# Patient Record
Sex: FEMALE | Race: BLACK OR AFRICAN AMERICAN | NOT HISPANIC OR LATINO | Employment: UNEMPLOYED | URBAN - METROPOLITAN AREA
[De-identification: names, ages, dates, MRNs, and addresses within clinical notes are randomized per-mention and may not be internally consistent; named-entity substitution may affect disease eponyms.]

---

## 2019-02-11 ENCOUNTER — OFFICE VISIT (OUTPATIENT)
Dept: URGENT CARE | Facility: URGENT CARE | Age: 29
End: 2019-02-11
Payer: COMMERCIAL

## 2019-02-11 ENCOUNTER — NURSE TRIAGE (OUTPATIENT)
Dept: NURSING | Facility: CLINIC | Age: 29
End: 2019-02-11

## 2019-02-11 VITALS
SYSTOLIC BLOOD PRESSURE: 104 MMHG | OXYGEN SATURATION: 100 % | TEMPERATURE: 99.1 F | DIASTOLIC BLOOD PRESSURE: 67 MMHG | HEART RATE: 83 BPM | WEIGHT: 229 LBS

## 2019-02-11 DIAGNOSIS — J20.9 ACUTE BRONCHITIS WITH SYMPTOMS > 10 DAYS: Primary | ICD-10-CM

## 2019-02-11 PROCEDURE — 99213 OFFICE O/P EST LOW 20 MIN: CPT | Performed by: NURSE PRACTITIONER

## 2019-02-11 RX ORDER — ALBUTEROL SULFATE 90 UG/1
1-2 AEROSOL, METERED RESPIRATORY (INHALATION) EVERY 4 HOURS PRN
Qty: 1 INHALER | Refills: 3 | Status: SHIPPED | OUTPATIENT
Start: 2019-02-11 | End: 2019-11-14

## 2019-02-11 RX ORDER — AZITHROMYCIN 250 MG/1
TABLET, FILM COATED ORAL
Qty: 6 TABLET | Refills: 0 | Status: SHIPPED | OUTPATIENT
Start: 2019-02-11 | End: 2019-02-18

## 2019-02-11 NOTE — LETTER
February 11, 2019      Rosanna Leung  [address] [place] [address]        To Whom It May Concern,     Due to a medical condition, Rosanna was seen in urgent care.      Sincerely,        TEGAN La CNP

## 2019-02-12 NOTE — TELEPHONE ENCOUNTER
"\" I have been having some shortness of breath, cough and a tightness in my throat and chest. I did have a chest cold a few weeks ago, but this feels a little different. My coworker has this same thing.\" Patient is having some SOB, but denies chest pain or tightness at this time. Denies fever or other sx. Triaged and advised to be seen. Call back if needed.     Reason for Disposition    [1] MILD difficulty breathing (e.g., minimal/no SOB at rest, SOB with walking, pulse <100) AND [2] NEW-onset or WORSE than normal    Additional Information    Negative: [1] Breathing stopped AND [2] hasn't returned    Negative: Choking on something    Negative: Severe difficulty breathing (e.g., struggling for each breath, speaks in single words)    Negative: Bluish lips, tongue, or face now    Negative: Difficult to awaken or acting confused  (e.g., disoriented, slurred speech)    Negative: Passed out (i.e., lost consciousness, collapsed and was not responding)    Negative: Wheezing started suddenly after medicine, an allergic food or bee sting    Negative: Stridor    Negative: Slow, shallow and weak breathing    Negative: Sounds like a life-threatening emergency to the triager    Negative: Chest pain    Negative: [1] Wheezing (high pitched whistling sound) AND [2] previous asthma attacks or use of asthma medicines    Negative: [1] Difficulty breathing AND [2] only present when coughing    Negative: [1] Difficulty breathing AND [2] only from stuffy or runny nose    Negative: [1] MODERATE difficulty breathing (e.g., speaks in phrases, SOB even at rest, pulse 100-120) AND [2] NEW-onset or WORSE than normal    Negative: Wheezing can be heard across the room    Negative: Drooling or spitting out saliva (because can't swallow)    Negative: History of prior \"blood clot\" in leg or lungs (i.e., deep vein thrombosis, pulmonary embolism)    Negative: History of inherited increased risk of blood clots (e.g., Factor 5 Leiden, Anti-thrombin 3, " "Protein C or Protein S deficiency, Prothrombin mutation)    Negative: Recent illness requiring prolonged bedrest (i.e., immobilization)    Negative: Hip or leg fracture in past 2 months (e.g., had cast on leg or ankle)    Negative: Major surgery in the past month    Negative: Recent long-distance travel with prolonged time in car, bus, plane, or train (i.e., within past 2 weeks; 6 or  more hours duration)    Negative: Extra heart beats OR irregular heart beating   (i.e., \"palpitations\")    Negative: Fever > 103 F (39.4 C)    Negative: [1] Fever > 101 F (38.3 C) AND [2] age > 60    Negative: [1] Fever > 101 F (38.3 C) AND [2] bedridden (e.g., nursing home patient, CVA, chronic illness, recovering from surgery)    Negative: [1] Fever > 100.5 F (38.1 C) AND [2] diabetes mellitus or weak immune system (e.g., HIV positive, cancer chemo, splenectomy, organ transplant, chronic steroids)    Negative: [1] Periods where breathing stops and then resumes normally AND [2] bedridden (e.g., nursing home patient, CVA)    Negative: Pregnant or postpartum (< 1 month since delivery)    Negative: Patient sounds very sick or weak to the triager    Protocols used: BREATHING DIFFICULTY-ADULT-AH      "

## 2019-02-12 NOTE — PROGRESS NOTES
SUBJECTIVE:   Rosanna Leung is a 28 year old female who presents to clinic today for the following health issues:  Chief Complaint   Patient presents with     Urgent Care     Cough     c/o cough and SOB for 1 day       2-3 weeks ago Rosanna developed URI symptoms.  For the past few days, her chest symptoms have worsened.  Cough.  Sinus congestion.  Ears are normal.  Eating and drinking okay.  Energy level is low.  Sore throat.  Coughing with a deep breath/laughing.         Problem list and histories reviewed & adjusted, as indicated.  Additional history: as documented    There is no problem list on file for this patient.    No past surgical history on file.    Social History     Tobacco Use     Smoking status: Never Smoker     Smokeless tobacco: Never Used   Substance Use Topics     Alcohol use: Not on file     No family history on file.      No current outpatient medications on file.     No Known Allergies  No lab results found.   BP Readings from Last 3 Encounters:   02/11/19 104/67    Wt Readings from Last 3 Encounters:   02/11/19 103.9 kg (229 lb)            Labs reviewed in EPIC    Reviewed and updated as needed this visit by clinical staff  Tobacco  Allergies  Meds  Soc Hx      Reviewed and updated as needed this visit by Provider         ROS:  Constitutional, HEENT, cardiovascular, pulmonary, GI, , musculoskeletal, neuro, skin, endocrine and psych systems are negative, except as otherwise noted.    OBJECTIVE:     /67   Pulse 83   Temp 99.1  F (37.3  C) (Tympanic)   Wt 103.9 kg (229 lb)   LMP 01/27/2019   SpO2 100%   There is no height or weight on file to calculate BMI.  EXAM:  Constitutional: healthy, alert, active and no distress  Neck: Neck supple. No adenopathy.  ENT: Bilateral TM's are normal.  Posterior oropharynx is clear.  Nares clear without congestion.  Cardiovascular: S1, S2  Respiratory: occassional hacky cough. Respirations easy and regular. No respiratory distress. Lungs sounds  reduced in the bases. Rare wheezing.   Skin: warm and dry  Psychiatric: mentation appears normal. and affect normal/bright      ASSESSMENT/PLAN:     (J20.9) Acute bronchitis with symptoms > 10 days  (primary encounter diagnosis)  Comment: acute  Plan: azithromycin (ZITHROMAX) 250 MG tablet,         albuterol (PROAIR HFA) 108 (90 Base) MCG/ACT         inhaler        Take antibiotic as prescribed. Symptomatic management (push fluids, good nutrition, MVI if indicated, OTC decongestants, etc). Return prn any problems, questions, or concerns.          TEGAN [facility]

## 2019-02-17 ENCOUNTER — NURSE TRIAGE (OUTPATIENT)
Dept: NURSING | Facility: CLINIC | Age: 29
End: 2019-02-17

## 2019-02-17 NOTE — TELEPHONE ENCOUNTER
"Rosanna called today with concerns over bronchitis symptoms returning.    She was seen on 2/11/19 at Urgent Care and treated for bronchitis. She reports feeling better initially but states yesterday she started feeling worse with return of shortness of breath.     She has been using the prescribed inhaler which she reports does help for a short while.    She is concerned of possibly developing pneumonia.    Per protocol, advised to be seen within 4 hours.  Patient will go to [facility].      Mattie Thomas RN  [facility]        Reason for Disposition    [1] MILD difficulty breathing (e.g., minimal/no SOB at rest, SOB with walking, pulse <100) AND [2] NEW-onset or WORSE than normal    Additional Information    Negative: [1] Breathing stopped AND [2] hasn't returned    Negative: Choking on something    Negative: Severe difficulty breathing (e.g., struggling for each breath, speaks in single words)    Negative: Bluish lips, tongue, or face now    Negative: Difficult to awaken or acting confused  (e.g., disoriented, slurred speech)    Negative: Passed out (i.e., lost consciousness, collapsed and was not responding)    Negative: Wheezing started suddenly after medicine, an allergic food or bee sting    Negative: Stridor    Negative: Slow, shallow and weak breathing    Negative: Sounds like a life-threatening emergency to the triager    Negative: [1] MODERATE difficulty breathing (e.g., speaks in phrases, SOB even at rest, pulse 100-120) AND [2] NEW-onset or WORSE than normal    Negative: Wheezing can be heard across the room    Negative: Drooling or spitting out saliva (because can't swallow)    Negative: History of prior \"blood clot\" in leg or lungs (i.e., deep vein thrombosis, pulmonary embolism)    Negative: History of inherited increased risk of blood clots (e.g., Factor 5 Leiden, Anti-thrombin 3, Protein C or Protein S deficiency, Prothrombin mutation)    Negative: Recent illness " "requiring prolonged bedrest (i.e., immobilization)    Negative: Hip or leg fracture in past 2 months (e.g., had cast on leg or ankle)    Negative: Major surgery in the past month    Negative: Recent long-distance travel with prolonged time in car, bus, plane, or train (i.e., within past 2 weeks; 6 or  more hours duration)    Negative: Extra heart beats OR irregular heart beating   (i.e., \"palpitations\")    Negative: Fever > 103 F (39.4 C)    Negative: [1] Fever > 101 F (38.3 C) AND [2] age > 60    Negative: [1] Fever > 101 F (38.3 C) AND [2] bedridden (e.g., nursing home patient, CVA, chronic illness, recovering from surgery)    Negative: [1] Fever > 100.5 F (38.1 C) AND [2] diabetes mellitus or weak immune system (e.g., HIV positive, cancer chemo, splenectomy, organ transplant, chronic steroids)    Negative: [1] Periods where breathing stops and then resumes normally AND [2] bedridden (e.g., nursing home patient, CVA)    Negative: Pregnant or postpartum (< 1 month since delivery)    Negative: Patient sounds very sick or weak to the triager    Protocols used: BREATHING DIFFICULTY-ADULT-AH      "

## 2019-02-18 ENCOUNTER — ANCILLARY PROCEDURE (OUTPATIENT)
Dept: GENERAL RADIOLOGY | Facility: CLINIC | Age: 29
End: 2019-02-18
Attending: FAMILY MEDICINE
Payer: COMMERCIAL

## 2019-02-18 ENCOUNTER — OFFICE VISIT (OUTPATIENT)
Dept: URGENT CARE | Facility: URGENT CARE | Age: 29
End: 2019-02-18
Payer: COMMERCIAL

## 2019-02-18 VITALS
DIASTOLIC BLOOD PRESSURE: 74 MMHG | TEMPERATURE: 98.4 F | SYSTOLIC BLOOD PRESSURE: 113 MMHG | OXYGEN SATURATION: 99 % | WEIGHT: 239 LBS | HEART RATE: 100 BPM

## 2019-02-18 DIAGNOSIS — J20.9 ACUTE BRONCHITIS WITH SYMPTOMS > 10 DAYS: Primary | ICD-10-CM

## 2019-02-18 DIAGNOSIS — R05.9 COUGH: ICD-10-CM

## 2019-02-18 PROCEDURE — 99214 OFFICE O/P EST MOD 30 MIN: CPT | Performed by: FAMILY MEDICINE

## 2019-02-18 PROCEDURE — 71046 X-RAY EXAM CHEST 2 VIEWS: CPT

## 2019-02-18 RX ORDER — DOXYCYCLINE HYCLATE 100 MG
100 TABLET ORAL 2 TIMES DAILY
Qty: 20 TABLET | Refills: 0 | Status: SHIPPED | OUTPATIENT
Start: 2019-02-18 | End: 2019-04-08

## 2019-02-18 RX ORDER — PREDNISONE 20 MG/1
40 TABLET ORAL DAILY
Qty: 10 TABLET | Refills: 0 | Status: SHIPPED | OUTPATIENT
Start: 2019-02-18 | End: 2019-04-08

## 2019-02-18 RX ORDER — UBIDECARENONE 75 MG
100 CAPSULE ORAL DAILY
COMMUNITY
End: 2020-06-30

## 2019-02-18 NOTE — PATIENT INSTRUCTIONS
Patient Education     Acute Bronchitis  Your healthcare provider has told you that you have acute bronchitis. Bronchitis is infection or inflammation of the bronchial tubes (airways in the lungs). Normally, air moves easily in and out of the airways. Bronchitis narrows the airways, making it harder for air to flow in and out of the lungs. This causes symptoms such as shortness of breath, coughing up yellow or green mucus, and wheezing. Bronchitis can be acute or chronic. Acute means the condition comes on quickly and goes away in a short time, usually within 3 to 10 days. Chronic means a condition lasts a long time and often comes back.    What causes acute bronchitis?  Acute bronchitis almost always starts as a viral respiratory infection, such as a cold or the flu. Certain factors make it more likely for a cold or flu to turn into bronchitis. These include being very young, being elderly, having a heart or lung problem, or having a weak immune system. Cigarette smoking also makes bronchitis more likely.  When bronchitis develops, the airways become swollen. The airways may also become infected with bacteria. This is known as a secondary infection.  Diagnosing acute bronchitis  Your healthcare provider will examine you and ask about your symptoms and health history. You may also have a sputum culture to test the fluid in your lungs. Chest X-rays may be done to look for infection in the lungs.  Treating acute bronchitis  Bronchitis usually clears up as the cold or flu goes away. You can help feel better faster by doing the following:    Take medicine as directed. You may be told to take ibuprofen or other over-the-counter medicines. These help relieve inflammation in your bronchial tubes. Your healthcare provider may prescribe an inhaler to help open up the bronchial tubes. Most of the time, acute bronchitis is caused by a viral infection. Antibiotics are usually not prescribed for viral infections.    Drink  plenty of fluids, such as water, juice, or warm soup. Fluids loosen mucus so that you can cough it up. This helps you breathe more easily. Fluids also prevent dehydration.    Make sure you get plenty of rest.    Do not smoke. Do not allow anyone else to smoke in your home.  Recovery and follow-up  Follow up with your doctor as you are told. You will likely feel better in a week or two. But a dry cough can linger beyond that time. Let your doctor know if you still have symptoms (other than a dry cough) after 2 weeks, or if you re prone to getting bronchial infections. Take steps to protect yourself from future infections. These steps include stopping smoking and avoiding tobacco smoke, washing your hands often, and getting a yearly flu shot.  When to call your healthcare provider  Call the healthcare provider if you have any of the following:    Fever of 100.4 F (38.0 C) or higher, or as advised    Symptoms that get worse, or new symptoms    Trouble breathing    Symptoms that don t start to improve within a week, or within 3 days of taking antibiotics   Date Last Reviewed: 12/1/2016 2000-2018 [employer]. [address] [place] [address], [place] [address]. All rights reserved. This information is not intended as a substitute for professional medical care. Always follow your healthcare professional's instructions.

## 2019-02-18 NOTE — PROGRESS NOTES
SUBJECTIVE:  Rosanna is a 28 year old female who presents to urgent care with cough and shortness of breath.  Has had about a month of symptoms. She was seen on 2/11 with cough, SOB, sinus congestion that was acutely worsening. Treated with a z-pack. She actually took only 1 pill on day one and then 1 each day until the last day when she took 2 (basically took the pack backwards).   She actually got much better while taking that throughout the week, nearly resolved. On Saturday night (the day after finishing antibiotics), she suddenly rebounded and had more symptoms, though the main one is now SOB, the cough is less severe. She has occasionally had a dull ache in her right deep chest on inspiration, but worse in the morning and then resolves.   She has felt hot/cold/clammy. No body aches. No LE swelling.    She has no asthma diagnosis, but does use an albuterol intermittently - used it yesterday and it did help.  She is not a smoker, no history of significant lung disease, no history of DVT.       OBJECTIVE:  /74   Pulse 100   Temp 98.4  F (36.9  C) (Tympanic)   Wt 108.4 kg (239 lb)   LMP 01/27/2019   SpO2 99%    GENERAL APPEARANCE: healthy, alert and no distress  Card: RRR, no murmur, normal S1/S2, no LE swelling.  Resp: mild wheeze throughout - otherwise clear to auscultation bilaterally, no crackles.     DIAGNOSTICS  Chest x-ray:  I have personally interpreted her chest xray and it was normal.        ASSESSMENT/PLAN:  Roasnna was seen today for urgent care and respiratory problems.    Diagnoses and all orders for this visit:    Acute bronchitis with symptoms > 10 days: 3-4weeks of cough, rhinorrhea. Previously treated with azithromycin which did significantly improve her symptoms. Also using albuterol which has been helping. We did a CXR which was entirely unremarkable. She is wheezing. Because of this constellation of symptoms, improvement on an antibiotic known for it's anti-inflammatory effects, we  opted to try a short burst of prednisone 40mg daily for 5 days. I did however write her a printed script for doxycycline to start if she worsens at all, has a true fever (>100.4) or is not improving at all with 2 days of prednisone. Discussed worrisome symptoms which should prompt ER evaluation and handout was given including red flags.  -     doxycycline hyclate (VIBRA-TABS) 100 MG tablet; Take 1 tablet (100 mg) by mouth 2 times daily for 10 days  -     predniSONE (DELTASONE) 20 MG tablet; Take 40 mg by mouth daily for 5 days 3 tabs once daily.    Cough  -     XR Chest 2 Views; Future        The plan of care was discussed with the Patient. They understand and agree with the course of treatment prescribed. A printed summary was given including instructions and medications.      Elisabeth Wilson MD  Family Medicine

## 2019-02-19 ENCOUNTER — TELEPHONE (OUTPATIENT)
Dept: URGENT CARE | Facility: URGENT CARE | Age: 29
End: 2019-02-19

## 2019-02-19 ENCOUNTER — NURSE TRIAGE (OUTPATIENT)
Dept: NURSING | Facility: CLINIC | Age: 29
End: 2019-02-19

## 2019-02-19 NOTE — TELEPHONE ENCOUNTER
Return call was made to pt.  Pt states she is having heart palpitations after taking two doses of  the prednisone.  Pt wants to know if she can d'c the prednisone and take the doxycyline alone to treat symptoms..  Please advise.    Rx for Doxy was called in to the [facility] [address] at pt's request.  Mireille Lamb/ MA

## 2019-02-19 NOTE — TELEPHONE ENCOUNTER
Rosanna was into Urgent Care yesterday and was prescribed Prednisone and adntibiotic.    Rosanna today has questions on taking antibiotic.  FNA advised to start antibiotic as temp is 100.5.  Rosanna is calling and is requesting to speak with MD Elisabeth Wilson about antibiotic and about getting a note to be excused from work.  Please phone Rosanna at [phone].

## 2019-02-19 NOTE — TELEPHONE ENCOUNTER
Clinic Action Needed:  Yes, callback  FNA Triage Call  Presenting Problem:    Rosanna was into Urgent Care yesterday and was prescribed Prednisone and adntibiotic.    Rosanna today has questions on taking antibiotic.  FNA advised to start antibiotic as temp is 100.5.  Rosanna is calling and is requesting to speak with MD Elisabeth Wilson about antibiotic and about getting a note to be excused from work.  Please phone Rosanna at [phone].      Routed to:  RN Pool  Please be sure to close this encounter once this patient's issue/question has been addressed.    Terese Melchor RN/[facility]

## 2019-04-08 ENCOUNTER — OFFICE VISIT (OUTPATIENT)
Dept: URGENT CARE | Facility: URGENT CARE | Age: 29
End: 2019-04-08
Payer: COMMERCIAL

## 2019-04-08 VITALS
SYSTOLIC BLOOD PRESSURE: 116 MMHG | OXYGEN SATURATION: 97 % | RESPIRATION RATE: 16 BRPM | WEIGHT: 242 LBS | TEMPERATURE: 98.2 F | HEART RATE: 80 BPM | DIASTOLIC BLOOD PRESSURE: 70 MMHG

## 2019-04-08 DIAGNOSIS — J45.21 MILD INTERMITTENT ASTHMA WITH EXACERBATION: Primary | ICD-10-CM

## 2019-04-08 PROCEDURE — 99213 OFFICE O/P EST LOW 20 MIN: CPT | Performed by: FAMILY MEDICINE

## 2019-04-08 RX ORDER — FLUTICASONE PROPIONATE 220 UG/1
1 AEROSOL, METERED RESPIRATORY (INHALATION) 2 TIMES DAILY
Qty: 1 INHALER | Refills: 0 | Status: SHIPPED | OUTPATIENT
Start: 2019-04-08 | End: 2019-08-01

## 2019-04-08 NOTE — LETTER
To Whom It MayConcern:       Rosanna Leung  was seen in our clinic on 4/8/2019        Patient may return to work on   uncertain .                            Restrictions: difficulty breathing    Comments:            Provider Signature:         ADOLFO Wilson MD

## 2019-04-09 ENCOUNTER — OFFICE VISIT (OUTPATIENT)
Dept: FAMILY MEDICINE | Facility: CLINIC | Age: 29
End: 2019-04-09
Payer: COMMERCIAL

## 2019-04-09 VITALS
HEART RATE: 88 BPM | DIASTOLIC BLOOD PRESSURE: 60 MMHG | WEIGHT: 239 LBS | TEMPERATURE: 97.8 F | SYSTOLIC BLOOD PRESSURE: 108 MMHG | RESPIRATION RATE: 16 BRPM | OXYGEN SATURATION: 97 %

## 2019-04-09 DIAGNOSIS — R53.83 FATIGUE, UNSPECIFIED TYPE: ICD-10-CM

## 2019-04-09 DIAGNOSIS — Z82.49 FAMILY HISTORY OF BLOOD CLOTS: ICD-10-CM

## 2019-04-09 DIAGNOSIS — R06.02 SHORTNESS OF BREATH: Primary | ICD-10-CM

## 2019-04-09 DIAGNOSIS — N92.6 IRREGULAR PERIODS: ICD-10-CM

## 2019-04-09 LAB
D DIMER PPP FEU-MCNC: 0.5 UG/ML FEU (ref 0–0.5)
ERYTHROCYTE [DISTWIDTH] IN BLOOD BY AUTOMATED COUNT: 12.3 % (ref 10–15)
FEF 25/75: NORMAL
FEV-1: NORMAL
FEV1/FVC: NORMAL
FVC: NORMAL
HCG UR QL: NEGATIVE
HCT VFR BLD AUTO: 43 % (ref 35–47)
HGB BLD-MCNC: 13.9 G/DL (ref 11.7–15.7)
MCH RBC QN AUTO: 27.5 PG (ref 26.5–33)
MCHC RBC AUTO-ENTMCNC: 32.3 G/DL (ref 31.5–36.5)
MCV RBC AUTO: 85 FL (ref 78–100)
PLATELET # BLD AUTO: 245 10E9/L (ref 150–450)
RBC # BLD AUTO: 5.06 10E12/L (ref 3.8–5.2)
WBC # BLD AUTO: 5.9 10E9/L (ref 4–11)

## 2019-04-09 PROCEDURE — 85379 FIBRIN DEGRADATION QUANT: CPT | Performed by: FAMILY MEDICINE

## 2019-04-09 PROCEDURE — 81025 URINE PREGNANCY TEST: CPT | Performed by: FAMILY MEDICINE

## 2019-04-09 PROCEDURE — 36415 COLL VENOUS BLD VENIPUNCTURE: CPT | Performed by: FAMILY MEDICINE

## 2019-04-09 PROCEDURE — 80053 COMPREHEN METABOLIC PANEL: CPT | Performed by: FAMILY MEDICINE

## 2019-04-09 PROCEDURE — 84439 ASSAY OF FREE THYROXINE: CPT | Performed by: FAMILY MEDICINE

## 2019-04-09 PROCEDURE — 84443 ASSAY THYROID STIM HORMONE: CPT | Performed by: FAMILY MEDICINE

## 2019-04-09 PROCEDURE — 94010 BREATHING CAPACITY TEST: CPT | Performed by: FAMILY MEDICINE

## 2019-04-09 PROCEDURE — 85027 COMPLETE CBC AUTOMATED: CPT | Performed by: FAMILY MEDICINE

## 2019-04-09 PROCEDURE — 99214 OFFICE O/P EST MOD 30 MIN: CPT | Mod: 25 | Performed by: FAMILY MEDICINE

## 2019-04-09 NOTE — LETTER
April 11, 2019      Rosanna Leung  [address] [place] [address]        Dear ,    We are writing to inform you of your test results.    The results of your blood tests are all normal, including thyroid, liver and kidney function, and blood counts.  The D dimer test was negative for any blood clot at this time.  I would still recommend considering further testing for hereditary blood clotting disorders, based on your family history.  It would be worth finding out if there is more information your family can provide on this.       The results of the spirometry showed a good response to albuterol, meaning that asthma is the most likely explanation of your symptoms.  Please continue to use flovent on a daily basis.  Use the albuterol just if needed.  Let's see how you are doing with this in about one month.  I've asked our triage nurses to call you about this and discuss having you come back in for a recheck in future.     Resulted Orders   CBC with platelets   Result Value Ref Range    WBC 5.9 4.0 - 11.0 10e9/L    RBC Count 5.06 3.8 - 5.2 10e12/L    Hemoglobin 13.9 11.7 - 15.7 g/dL    Hematocrit 43.0 35.0 - 47.0 %    MCV 85 78 - 100 fl    MCH 27.5 26.5 - 33.0 pg    MCHC 32.3 31.5 - 36.5 g/dL    RDW 12.3 10.0 - 15.0 %    Platelet Count 245 150 - 450 10e9/L   Comprehensive metabolic panel   Result Value Ref Range    Sodium 140 133 - 144 mmol/L    Potassium 4.2 3.4 - 5.3 mmol/L    Chloride 105 94 - 109 mmol/L    Carbon Dioxide 28 20 - 32 mmol/L    Anion Gap 7 3 - 14 mmol/L    Glucose 88 70 - 99 mg/dL      Comment:      Non Fasting    Urea Nitrogen 9 7 - 30 mg/dL    Creatinine 0.83 0.52 - 1.04 mg/dL    GFR Estimate >90 >60 mL/min/[1.73_m2]      Comment:      Non  GFR Calc  Starting 12/18/2018, serum creatinine based estimated GFR (eGFR) will be   calculated using the Chronic Kidney Disease Epidemiology Collaboration   (CKD-EPI) equation.      GFR Estimate If Black >90 >60 mL/min/[1.73_m2]       Comment:       GFR Calc  Starting 12/18/2018, serum creatinine based estimated GFR (eGFR) will be   calculated using the Chronic Kidney Disease Epidemiology Collaboration   (CKD-EPI) equation.      Calcium 9.6 8.5 - 10.1 mg/dL    Bilirubin Total 0.2 0.2 - 1.3 mg/dL    Albumin 3.7 3.4 - 5.0 g/dL    Protein Total 7.7 6.8 - 8.8 g/dL    Alkaline Phosphatase 100 40 - 150 U/L    ALT 23 0 - 50 U/L    AST 17 0 - 45 U/L   TSH   Result Value Ref Range    TSH 0.98 0.40 - 4.00 mU/L   T4, free   Result Value Ref Range    T4 Free 1.00 0.76 - 1.46 ng/dL   D dimer, quantitative   Result Value Ref Range    D Dimer 0.5 0.0 - 0.50 ug/ml FEU      Comment:      This D-dimer assay is intended for use in conjunction with a clinical pretest   probability assessment model to exclude pulmonary embolism (PE) and deep   venous thrombosis (DVT) in outpatients suspected of PE or DVT. The cut-off   value is 0.5 ug/mL FEU.     HCG Qual, Urine ([id number])   Result Value Ref Range    HCG Qual Urine Negative NEG^Negative      Comment:      This test is for screening purposes.  Results should be interpreted along with   the clinical picture.  Confirmation testing is available if warranted by   ordering [id number], HCG Quantitative Pregnancy.       If you have any questions or concerns, please call the clinic at the number listed above.     Sincerely,  Cammie Barajas MD/nr

## 2019-04-09 NOTE — LETTER
[address] [address]  Phone: [phone]    April 9, 2019        Rosanna Leung  [address] [place] [address]          To Whom It May Concern:    RE: Rosanna Leung    Patient was seen and treated today at our clinic.  Please excuse her from work.    Please contact me for questions or concerns.      Sincerely,        Cammie Barajas MD

## 2019-04-09 NOTE — PROGRESS NOTES
"  SUBJECTIVE:   Rosanna Leugn is a 28 year old female who presents to clinic today for the following   health issues:      ED/ Followup:    Facility:  [facility]  Date of visit: 04/08/2019  Reason for visit: cough  Current Status: reports inhaler is helping symptoms      About two months ago, she developed cough and shortness of breath and was diagnosed with bronchitis.  This was her first experience with bronchitis, or any lung problem other than \"getting the flu a lot as a child.\"   She denies any prior history of asthma, bronchitis, allergies or pneumonia.  She was prescribed albuterol and azithromycin.  The albuterol did seem to help at that time and her symptoms seemed to be resolving during the week of antibiotic treatment, but then she suddenly had worsening SOB and cough.  She went back to  where her CXR was normal; there was wheezing on exam and she was prescribed a short course of prednisone.  She did not do well with the side effects, though it did help a little.  She returned to  yesterday with gradually worsening symptoms and was prescribed flovent.  She tried it for the first time today and denies any AEs and feels it did help for a few hours in the morning.  At this point, she feels the albuterol is not doing very much for her.  She describes her symptom as feeling like there is a lump in her throat or \"something sitting on my chest\" which is worse when she is lying down.  She does not have a night time cough, but does cough up some phlegm in the morning.  She does not have worse dyspnea with exertion, but then will feel more short of breath after her activity.  Cold air does make it worse. Because her symptoms are worse in the morning, she has been calling in sick to work at times, as she has to be talking on the phone and will sound and feel breathless while she is working.  She does have some cough and wheeze at times, again, worse in the morning and after she has " been more active.  She denies any fevers or chills.  No sinus pain/pressure.  She has felt increased fatigue over the past couple of months.  She has had some intermittent episodes of dizziness, in which she feels the room is moving, or it feels like she is leaning to the left.  Episodes of dizziness are brief and resolve with sitting down and drinking some water.  One episode occurred while getting out of bed; another occurred while doing dishes.   This is not clearly related to the dyspnea, though she isn't sure.  She denies any chest pain.  She states her feet sometimes look puffy, especially around the time of her period; she denies any calf swelling or pain . She used to take OCPs, but stopped in November.  No recent travel or surgery.  Her job is sedentary.  She does not smoke.      Her family history is notable for a brother with asthma.  Her mother  of pulmonary embolism.  Her maternal aunt has also had blood clots.  She is not sure if they have had testing for hereditary thrombophilia.      The patient has not smoked.  She has not been around any known lung irritants or asbestos.           Irregular menses: the patient reports irregular periods over the past several months.  Her last period was in January.  No breast tenderness or nausea; she did have a negative pregnancy test in November, which was the last time she had been sexually active.  Reports menarche at age 8 or 9.  States that in the past, her periods were sometimes irregular, but not always.  She did take OCPs for a time, and periods were regular on OCPs.  She also tried Nexplanon, but had it removed due to weight gain.       Fatigue: as above, has been more tired over the past 2 months.  Reports non-restorative sleep.  Didn't used to snore, but has lately.  Does not think that she stops breathing in her sleep, but isn't sure.  Does not fall asleep unintentionally during the day, but is excessively tired, and can take a nap if she wants.   Dizziness, dyspnea, pedal swelling and period irregularity as above.          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             Additional history: as documented    Reviewed  and updated as needed this visit by clinical staff  Tobacco  Allergies  Meds  Med Hx  Surg Hx  Fam Hx  Soc Hx        Reviewed and updated as needed this visit by Provider         There is no problem list on file for this patient.    History reviewed. No pertinent surgical history.    Social History     Tobacco Use     Smoking status: Never Smoker     Smokeless tobacco: Never Used   Substance Use Topics     Alcohol use: Yes     Family History   Problem Relation Age of Onset     Asthma Brother      Sickle Cell Anemia Cousin      Sickle Cell Anemia Maternal Aunt          Current Outpatient Medications   Medication Sig Dispense Refill     albuterol (PROAIR HFA) 108 (90 Base) MCG/ACT inhaler Inhale 1-2 puffs into the lungs every 4 hours as needed for shortness of breath / dyspnea or wheezing 1 Inhaler 3     cholecalciferol (VITAMIN D3) 5000 units TABS tablet Take by mouth daily       fluticasone (FLOVENT HFA) 220 MCG/ACT inhaler Inhale 1 puff into the lungs 2 times daily 1 Inhaler 0     vitamin B-12 (CYANOCOBALAMIN) 100 MCG tablet Take 100 mcg by mouth daily       No Known Allergies    ROS:  Constitutional, HEENT, cardiovascular, pulmonary, GI, , musculoskeletal, neuro, skin, endocrine and psych systems are negative, except as otherwise noted.    OBJECTIVE:     /60 (BP Location: Right arm, Patient Position: Sitting, Cuff Size: Adult Large)   Pulse 88   Temp 97.8  F (36.6  C) (Oral)   Resp 16   Wt 108.4 kg (239 lb)   LMP 02/07/2019   SpO2 97%   There is no height or weight on file to calculate BMI.  GENERAL APPEARANCE: obese, alert and no distress  EYES: Eyes grossly normal to inspection, PERRL and conjunctivae and sclerae normal  HENT: ear canals and TM's normal and nose and mouth without ulcers or lesions  NECK: no  adenopathy, no asymmetry, masses, or scars and thyroid normal to palpation  RESP: lungs clear to auscultation - no rales, rhonchi or wheezes, somewhat decreased air movement throughout, normal respiratory effort, and speaking in full sentences without dyspnea.    CV: regular rates and rhythm, normal S1 S2, no S3 or S4 and no murmur, click or rub  ABDOMEN: soft, nontender, bowel sounds normal  MS: extremities normal- no gross deformities noted, no edema  NEURO: Normal strength and tone, mentation intact and speech normal  PSYCH: mentation appears normal and affect normal/bright        ASSESSMENT/PLAN:             1. Shortness of breath  While her symptoms are most likely related to a new onset asthma, particularly as her providers prior to me have heard wheezing on exam, this seems the most likely etiology. Will check PFTs pre- and post-albuterol.  Her last albuterol use was about 7 hrs ago.  I did also consider other possible etiologist, such as PE given her family history and will check a D dimer.  A cardiac etiology seems unlikely given her age and lack of other risk factors, but could consider an echocardiogram as well given SOB worse with lying down and intermittent feet swelling.  No edema on exam today, and no rales, so will defer at this time.   - CBC with platelets  - Comprehensive metabolic panel  - TSH  - T4, free  - D dimer, quantitative  - General PFT Lab (Please always keep checked); Future  - Pulmonary Function Test; Future  - Spirometry, Breathing Capacity: Normal Order, Clinic Performed    2. Fatigue, unspecified type  A wide differential, but uncontrolled asthma and possible SANDRA are among the top possibilities in my mind . Will also check on thyroid, given weight change (though likely due to Nexplanon) and irregular menses.    - CBC with platelets  - Comprehensive metabolic panel  - TSH  - T4, free  - D dimer, quantitative  - HCG Qual, Urine ([id number])    3. Irregular periods    - HCG Qual, Urine  ([id number])  - OB/GYN REFERRAL    4. Family history of blood clots  Discussed that she should talk with her family to learn more about this.  Would offer her thrombophilia panel if indicated, particularly if considering re-starting any estrogen-containing contraceptives.            Cammie Barajas MD  [facility]

## 2019-04-09 NOTE — NURSING NOTE
The following nebulizer treatment was given:     MEDICATION: Albuterol Sulfate 2.5 mg  : [place]   LOT #: [id number]  EXPIRATION DATE:  7/2020  NDC # 20039-848-61     Nebulizer Start Time:  1:20  Nebulizer Stop Time:  1:25  See Vital Signs Flowsheet      Tarik Santiago MA

## 2019-04-09 NOTE — PROGRESS NOTES
Subjective: See notes from February.  She got better but has slowly gotten bad again, for the last 3 weeks wheezing more and more, short of breath, had to miss work a couple of times last week and again today, thought she might have a fever on the weekend, not really coughing much up.  Head is clear.  She never had asthma as a child.  She has a sister with asthma.  The albuterol helps for a while sometimes and other times not so much.    Objective: NAD.  Vitals are stable.  ENT normal.  Neck is normal.  Lungs are clear.  Heart is regular without murmurs.  No edema.    Assessment and plan: I think this is a new onset of asthma.  I do not think there is any anything infectious right now.  She will start Flovent twice daily and continue to use the albuterol as needed and try to follow-up with a new primary care doctor in a week to 10 days.  She does not have a primary care doctor.

## 2019-04-10 ENCOUNTER — NURSE TRIAGE (OUTPATIENT)
Dept: NURSING | Facility: CLINIC | Age: 29
End: 2019-04-10

## 2019-04-10 ENCOUNTER — TELEPHONE (OUTPATIENT)
Dept: NURSING | Facility: CLINIC | Age: 29
End: 2019-04-10

## 2019-04-10 LAB
ALBUMIN SERPL-MCNC: 3.7 G/DL (ref 3.4–5)
ALP SERPL-CCNC: 100 U/L (ref 40–150)
ALT SERPL W P-5'-P-CCNC: 23 U/L (ref 0–50)
ANION GAP SERPL CALCULATED.3IONS-SCNC: 7 MMOL/L (ref 3–14)
AST SERPL W P-5'-P-CCNC: 17 U/L (ref 0–45)
BILIRUB SERPL-MCNC: 0.2 MG/DL (ref 0.2–1.3)
BUN SERPL-MCNC: 9 MG/DL (ref 7–30)
CALCIUM SERPL-MCNC: 9.6 MG/DL (ref 8.5–10.1)
CHLORIDE SERPL-SCNC: 105 MMOL/L (ref 94–109)
CO2 SERPL-SCNC: 28 MMOL/L (ref 20–32)
CREAT SERPL-MCNC: 0.83 MG/DL (ref 0.52–1.04)
GFR SERPL CREATININE-BSD FRML MDRD: >90 ML/MIN/{1.73_M2}
GLUCOSE SERPL-MCNC: 88 MG/DL (ref 70–99)
POTASSIUM SERPL-SCNC: 4.2 MMOL/L (ref 3.4–5.3)
PROT SERPL-MCNC: 7.7 G/DL (ref 6.8–8.8)
SODIUM SERPL-SCNC: 140 MMOL/L (ref 133–144)
T4 FREE SERPL-MCNC: 1 NG/DL (ref 0.76–1.46)
TSH SERPL DL<=0.005 MIU/L-ACNC: 0.98 MU/L (ref 0.4–4)

## 2019-04-10 ASSESSMENT — ASTHMA QUESTIONNAIRES: ACT_TOTALSCORE: 9

## 2019-04-10 NOTE — TELEPHONE ENCOUNTER
Cammie Barajas MD,   Please see phone message below.    Patient requesting Spirometry results.     Writer notes spirometry completed 04/09/2018 at office visit    Please advise    Thank You!  Mayra Cleaning RN  Triage Nurse

## 2019-04-10 NOTE — TELEPHONE ENCOUNTER
Per MA report there was an excellent response to bronchodilator, consistent with a diagnosis of asthma.  I would like her to continue on the flovent and f/u in 1 month.    However, the data from the pre-bronchodilator was not saved, so I do not have that to review.  Will forward to admin and request that we be able to re-do the test at no additional charge to the patient.  She should not use her albuterol on that day (ok to use flovent).  Ok to be a nurse-only visit, but please await approval from admin about the charge.  Thank you.

## 2019-04-10 NOTE — TELEPHONE ENCOUNTER
Called patient, relayed provider message below. Patient verbalized understanding and is in agreement with plan    Will await management input on plan for re-do spirometry     Mayra Cleaning, RN  Triage Nurse

## 2019-04-10 NOTE — TELEPHONE ENCOUNTER
Patient is calling for her Spirometery results. They are in but there is no information I could use to explain how the results are. Please call patient about the spirometry results from yesterday.  Aleyda Beal RN-[facility]

## 2019-04-10 NOTE — TELEPHONE ENCOUNTER
Patient is calling for her Spirometery results. They are in but there is no information I could use to explain how the results are. Please call patient about the spirometry results from yesterday.  Epic encounter sent to the patient's clinic.    Aleyda Beal RN-[facility]

## 2019-05-17 ENCOUNTER — TELEPHONE (OUTPATIENT)
Dept: FAMILY MEDICINE | Facility: CLINIC | Age: 29
End: 2019-05-17

## 2019-05-17 NOTE — TELEPHONE ENCOUNTER
Reason for Call:  Form, our goal is to have forms completed with 72 hours, however, some forms may require a visit or additional information.    Type of letter, form or note:  medical    Who is the form from?: Patient    Where did the form come from: Patient or family brought in       What clinic location was the form placed at?: [facility]    Where the form was placed: yellow folder Box/Folder    What number is listed as a contact on the form?: [phone]        Additional comments: Pt brought form in and when complete fax it to [phone] SRAV[facility] Miguel. Call pt when complete. Place in yellow folder. Please Advise thank you    Call taken on 5/17/2019 at 2:15 PM by Mae Tinajero

## 2019-05-29 NOTE — TELEPHONE ENCOUNTER
Can you please tell me what the forms are for?  I don't recall discussing doing any forms with her, and she cancelled her appt with me recently (I had thought she was coming in to discuss the forms).  Thanks.

## 2019-05-29 NOTE — TELEPHONE ENCOUNTER
Ok, then I am going to wait until I see her to do the forms, as I am not sure what they are pertaining to.  Did she say what the WC forms are for?

## 2019-05-29 NOTE — TELEPHONE ENCOUNTER
It's for workers comp. Called pt to verify if she still need the form. Pt state she needs form completed. Scheduled pt an appt to see provider on 6/5.    Tarik Santiago MA

## 2019-06-05 ENCOUNTER — OFFICE VISIT (OUTPATIENT)
Dept: FAMILY MEDICINE | Facility: CLINIC | Age: 29
End: 2019-06-05

## 2019-06-05 ENCOUNTER — OFFICE VISIT (OUTPATIENT)
Dept: FAMILY MEDICINE | Facility: CLINIC | Age: 29
End: 2019-06-05
Payer: COMMERCIAL

## 2019-06-05 VITALS
WEIGHT: 246 LBS | RESPIRATION RATE: 14 BRPM | HEART RATE: 74 BPM | TEMPERATURE: 97.7 F | DIASTOLIC BLOOD PRESSURE: 62 MMHG | OXYGEN SATURATION: 99 % | SYSTOLIC BLOOD PRESSURE: 102 MMHG

## 2019-06-05 DIAGNOSIS — N92.6 IRREGULAR PERIODS: ICD-10-CM

## 2019-06-05 DIAGNOSIS — R06.02 SHORTNESS OF BREATH: Primary | ICD-10-CM

## 2019-06-05 DIAGNOSIS — N92.6 IRREGULAR PERIODS: Primary | ICD-10-CM

## 2019-06-05 PROCEDURE — 99213 OFFICE O/P EST LOW 20 MIN: CPT | Mod: 25 | Performed by: FAMILY MEDICINE

## 2019-06-05 PROCEDURE — 94640 AIRWAY INHALATION TREATMENT: CPT | Performed by: FAMILY MEDICINE

## 2019-06-05 PROCEDURE — 99213 OFFICE O/P EST LOW 20 MIN: CPT | Performed by: FAMILY MEDICINE

## 2019-06-05 RX ORDER — ALBUTEROL SULFATE 0.83 MG/ML
2.5 SOLUTION RESPIRATORY (INHALATION) ONCE
Status: COMPLETED | OUTPATIENT
Start: 2019-06-05 | End: 2019-06-05

## 2019-06-05 RX ADMIN — ALBUTEROL SULFATE 2.5 MG: 0.83 SOLUTION RESPIRATORY (INHALATION) at 14:13

## 2019-06-05 ASSESSMENT — ASTHMA QUESTIONNAIRES
QUESTION_2 LAST FOUR WEEKS HOW OFTEN HAVE YOU HAD SHORTNESS OF BREATH: ONCE A DAY
ACUTE_EXACERBATION_TODAY: NO
QUESTION_5 LAST FOUR WEEKS HOW WOULD YOU RATE YOUR ASTHMA CONTROL: SOMEWHAT CONTROLLED
ACT_TOTALSCORE: 9
QUESTION_3 LAST FOUR WEEKS HOW OFTEN DID YOUR ASTHMA SYMPTOMS (WHEEZING, COUGHING, SHORTNESS OF BREATH, CHEST TIGHTNESS OR PAIN) WAKE YOU UP AT NIGHT OR EARLIER THAN USUAL IN THE MORNING: FOUR OR MORE NIGHTS A WEEK
QUESTION_4 LAST FOUR WEEKS HOW OFTEN HAVE YOU USED YOUR RESCUE INHALER OR NEBULIZER MEDICATION (SUCH AS ALBUTEROL): THREE OR MORE TIMES PER DAY
QUESTION_1 LAST FOUR WEEKS HOW MUCH OF THE TIME DID YOUR ASTHMA KEEP YOU FROM GETTING AS MUCH DONE AT WORK, SCHOOL OR AT HOME: MOST OF THE TIME

## 2019-06-05 NOTE — PROGRESS NOTES
Subjective     Rosanna Leung is a 28 year old female who presents to clinic today for the following health issues:    HPI   Work Comp Follow Up    Respiratory symptoms: she reports that her cough and shortness of breath are better since starting the Advair two months ago, however, she is still having shortness of breath at night, which is relieved by using her albuterol inhaler.  She also has shortness of breath and cough with exercise, also relieved by albuterol.  As such, she has been using her albuterol inhaler on a daily basis still.  She does also have some mild allergy symptoms with mild rhinorrhea, sneezing, and itching eyes.  She is not taking anything for her allergies.  She is here today to request that I fill out Workman's Compensation paperwork.  She states that she thinks she caught bronchitis from someone at work, and that this caused her to have asthma.  She tells me she missed so much work while she was sick with this that she lost her job.    She is currently feeling short of breath and has forgotten to bring her inhaler and requests a treatment.  She notes she had a URI recently, and her symptoms did seem to worsen then.              There is no problem list on file for this patient.    No past surgical history on file.    Social History     Tobacco Use     Smoking status: Never Smoker     Smokeless tobacco: Never Used   Substance Use Topics     Alcohol use: Yes     Family History   Problem Relation Age of Onset     Asthma Brother      Sickle Cell Anemia Cousin      Sickle Cell Anemia Maternal Aunt          Current Outpatient Medications   Medication Sig Dispense Refill     albuterol (PROAIR HFA) 108 (90 Base) MCG/ACT inhaler Inhale 1-2 puffs into the lungs every 4 hours as needed for shortness of breath / dyspnea or wheezing 1 Inhaler 3     cholecalciferol (VITAMIN D3) 5000 units TABS tablet Take by mouth daily       fluticasone (FLOVENT HFA) 220 MCG/ACT inhaler Inhale 1 puff into the lungs 2  times daily 1 Inhaler 0     vitamin B-12 (CYANOCOBALAMIN) 100 MCG tablet Take 100 mcg by mouth daily       No Known Allergies      Reviewed and updated as needed this visit by Provider         Review of Systems   ROS COMP: Constitutional, HEENT, cardiovascular, pulmonary, gi and gu systems are negative, except as otherwise noted.      Objective    /62   Pulse 74   Temp 97.7  F (36.5  C)   Resp 14   Wt 111.6 kg (246 lb)   SpO2 99%   There is no height or weight on file to calculate BMI.  Physical Exam   GENERAL: healthy, alert and no distress  EYES: Eyes grossly normal to inspection, PERRL and conjunctivae and sclerae normal  NECK: no adenopathy, no asymmetry, masses, or scars and thyroid normal to palpation  RESP: decreased air movement but no wheeze, rales or rhonchi on first examination.   After albuterol neb, the air movement is improved, her symptoms are improved; there is still no wheezing.    CV: regular rate and rhythm, normal S1 S2, no S3 or S4, no murmur, click or rub, no peripheral edema and peripheral pulses strong            Assessment & Plan     1. Shortness of breath  [facility] providers had noted wheezing and her symptoms are relieved at least temporarily with albuterol, making asthma the likely diagnosis; we had done spirometry in clinic last visit, but unfortunately the pre-albuterol data was not recorded, and only the post-albuterol results, which were normal, were recorded.  She is subjectively reporting some improvement on advair, but is still using her albuterol frequently.  I advised treating her allergies with zyrtec or similar, and discussed that allergies are another common trigger for asthma.  I advised a consultation with pulmonology.  I also advised that this does not really fit WC to my understanding of it; FMLA would have been more appropriate at the time.  I've asked her to have her HR office send in FMLA paperwork if it makes sense, which I will complete.    - PULMONARY MEDICINE  REFERRAL  - albuterol (PROVENTIL) neb solution 2.5 mg               No follow-ups on file.    Cammie Barajas MD  [facility]

## 2019-06-05 NOTE — PATIENT INSTRUCTIONS
See the lung specialist--call to set up    Have your HR send me FMLA paperwork.     Ibuprofen 800mg with food every 8 hours to stop bleeding.

## 2019-06-05 NOTE — NURSING NOTE
The following nebulizer treatment was given:     MEDICATION: Albuterol Sulfate 2.5 mg  : [place]  LOT #: [id number]  EXPIRATION DATE:  Oct 1 20  NDC # 43419-827-06      See Vital Signs Flowsheet    Toshia Can MA

## 2019-06-06 ASSESSMENT — ASTHMA QUESTIONNAIRES: ACT_TOTALSCORE: 9

## 2019-06-15 NOTE — PROGRESS NOTES
CC:  29 yo F presents for WC visit for an unrelated problem and also wants to update us on her Irregular bleeding--the patient continues to have irregular bleeding.  She was most recently using Nexplanon for contraception, but had this removed about a year ago.  She has now been having menstrual bleeding for the past 9 days.  The flow is moderate.  A pad is soaked overnight, not every hour.  Not dizzy, fatigued or any palpitations.  She does have an appointment with OBGYN (I had referred her last visit) tomorrow.     No Known Allergies  Current Outpatient Medications   Medication     albuterol (PROAIR HFA) 108 (90 Base) MCG/ACT inhaler     cholecalciferol (VITAMIN D3) 5000 units TABS tablet     fluticasone (FLOVENT HFA) 220 MCG/ACT inhaler     vitamin B-12 (CYANOCOBALAMIN) 100 MCG tablet     No current facility-administered medications for this visit.      Active Ambulatory Problems     Diagnosis Date Noted     No Active Ambulatory Problems     Resolved Ambulatory Problems     Diagnosis Date Noted     No Resolved Ambulatory Problems     No Additional Past Medical History         ROS COMP: Constitutional, HEENT, cardiovascular, pulmonary, gi and gu systems are negative, except as otherwise noted.      Objective    /62   Pulse 74   Temp 97.7  F (36.5  C)   Resp 14   Wt 111.6 kg (246 lb)   SpO2 99%   There is no height or weight on file to calculate BMI.  Physical Exam   GENERAL: healthy, alert and no distress  EYES: Eyes grossly normal to inspection, PERRL and conjunctivae and sclerae normal  NECK: no adenopathy, no asymmetry, masses, or scars and thyroid normal to palpation  RESP: decreased air movement but no wheeze, rales or rhonchi on first examination.   After albuterol neb, the air movement is improved, her symptoms are improved; there is still no wheezing.    CV: regular rate and rhythm, normal S1 S2, no S3 or S4, no murmur, click or rub, no peripheral edema and peripheral pulses strong    A/P    1.  Irregular periods  Most likely anovulatory bleeding; patient to see OB/GYN tomorrow.  She can try ibuprofen 800mg TID with food in the meantime.

## 2019-06-17 ENCOUNTER — OFFICE VISIT (OUTPATIENT)
Dept: FAMILY MEDICINE | Facility: CLINIC | Age: 29
End: 2019-06-17

## 2019-06-17 VITALS
SYSTOLIC BLOOD PRESSURE: 106 MMHG | WEIGHT: 241 LBS | DIASTOLIC BLOOD PRESSURE: 74 MMHG | BODY MASS INDEX: 42.7 KG/M2 | OXYGEN SATURATION: 99 % | TEMPERATURE: 98 F | HEART RATE: 82 BPM | HEIGHT: 63 IN | RESPIRATION RATE: 18 BRPM

## 2019-06-17 DIAGNOSIS — E66.01 MORBID OBESITY (H): ICD-10-CM

## 2019-06-17 DIAGNOSIS — Z00.00 ROUTINE GENERAL MEDICAL EXAMINATION AT A HEALTH CARE FACILITY: Primary | ICD-10-CM

## 2019-06-17 DIAGNOSIS — Z83.2 FAMILY HISTORY OF CLOTTING DISORDER: ICD-10-CM

## 2019-06-17 DIAGNOSIS — N92.6 IRREGULAR MENSES: ICD-10-CM

## 2019-06-17 LAB
APTT PPP: 35 SEC (ref 22–37)
HBA1C MFR BLD: 6 % (ref 0–5.6)
INR PPP: 0.96 (ref 0.86–1.14)

## 2019-06-17 PROCEDURE — 85613 RUSSELL VIPER VENOM DILUTED: CPT | Performed by: FAMILY MEDICINE

## 2019-06-17 PROCEDURE — 84146 ASSAY OF PROLACTIN: CPT | Performed by: FAMILY MEDICINE

## 2019-06-17 PROCEDURE — 80061 LIPID PANEL: CPT | Performed by: FAMILY MEDICINE

## 2019-06-17 PROCEDURE — 86146 BETA-2 GLYCOPROTEIN ANTIBODY: CPT | Performed by: FAMILY MEDICINE

## 2019-06-17 PROCEDURE — G0145 SCR C/V CYTO,THINLAYER,RESCR: HCPCS | Performed by: FAMILY MEDICINE

## 2019-06-17 PROCEDURE — 85303 CLOT INHIBIT PROT C ACTIVITY: CPT | Performed by: FAMILY MEDICINE

## 2019-06-17 PROCEDURE — 99213 OFFICE O/P EST LOW 20 MIN: CPT | Mod: 25 | Performed by: FAMILY MEDICINE

## 2019-06-17 PROCEDURE — 85610 PROTHROMBIN TIME: CPT | Performed by: FAMILY MEDICINE

## 2019-06-17 PROCEDURE — 86146 BETA-2 GLYCOPROTEIN ANTIBODY: CPT | Mod: 59 | Performed by: FAMILY MEDICINE

## 2019-06-17 PROCEDURE — 00000401 ZZHCL STATISTIC THROMBIN TIME NC: Performed by: FAMILY MEDICINE

## 2019-06-17 PROCEDURE — 90471 IMMUNIZATION ADMIN: CPT | Performed by: FAMILY MEDICINE

## 2019-06-17 PROCEDURE — 81241 F5 GENE: CPT | Performed by: FAMILY MEDICINE

## 2019-06-17 PROCEDURE — 99395 PREV VISIT EST AGE 18-39: CPT | Mod: 25 | Performed by: FAMILY MEDICINE

## 2019-06-17 PROCEDURE — 36415 COLL VENOUS BLD VENIPUNCTURE: CPT | Performed by: FAMILY MEDICINE

## 2019-06-17 PROCEDURE — 85730 THROMBOPLASTIN TIME PARTIAL: CPT | Performed by: FAMILY MEDICINE

## 2019-06-17 PROCEDURE — 90715 TDAP VACCINE 7 YRS/> IM: CPT | Performed by: FAMILY MEDICINE

## 2019-06-17 PROCEDURE — 85306 CLOT INHIBIT PROT S FREE: CPT | Performed by: FAMILY MEDICINE

## 2019-06-17 PROCEDURE — 83036 HEMOGLOBIN GLYCOSYLATED A1C: CPT | Performed by: FAMILY MEDICINE

## 2019-06-17 PROCEDURE — 85730 THROMBOPLASTIN TIME PARTIAL: CPT | Mod: 59 | Performed by: FAMILY MEDICINE

## 2019-06-17 RX ORDER — ACETAMINOPHEN AND CODEINE PHOSPHATE 120; 12 MG/5ML; MG/5ML
0.35 SOLUTION ORAL DAILY
Qty: 84 TABLET | Refills: 3 | Status: SHIPPED | OUTPATIENT
Start: 2019-06-17 | End: 2019-07-30

## 2019-06-17 RX ORDER — LEVONORGESTREL AND ETHINYL ESTRADIOL 0.15-0.03
1 KIT ORAL DAILY
Status: CANCELLED | OUTPATIENT
Start: 2019-06-17

## 2019-06-17 RX ORDER — LEVONORGESTREL AND ETHINYL ESTRADIOL 0.15-0.03
1 KIT ORAL DAILY
COMMUNITY
End: 2019-07-30

## 2019-06-17 ASSESSMENT — ENCOUNTER SYMPTOMS
DIZZINESS: 0
HEMATURIA: 0
NERVOUS/ANXIOUS: 0
SORE THROAT: 0
CONSTIPATION: 0
CHILLS: 0
EYE PAIN: 0
DIARRHEA: 0
PALPITATIONS: 0
SHORTNESS OF BREATH: 1
ARTHRALGIAS: 0
HEARTBURN: 0
ABDOMINAL PAIN: 0
FREQUENCY: 0
JOINT SWELLING: 0
PARESTHESIAS: 0
COUGH: 0
HEMATOCHEZIA: 0
NAUSEA: 0
HEADACHES: 0
MYALGIAS: 0
FEVER: 0
DYSURIA: 0
WEAKNESS: 0

## 2019-06-17 ASSESSMENT — MIFFLIN-ST. JEOR: SCORE: 1787.3

## 2019-06-17 NOTE — PROGRESS NOTES
"   SUBJECTIVE:   CC: Rosanna Leung is an 28 year old woman who presents for preventive health visit.     Healthy Habits:    Do you get at least three servings of calcium containing foods daily (dairy, green leafy vegetables, etc.)? { :537691::\"yes\"}    Amount of exercise or daily activities, outside of work: { :082072}    Problems taking medications regularly { :361333::\"No\"}    Medication side effects: { :014517::\"No\"}    Have you had an eye exam in the past two years? { :059245}    Do you see a dentist twice per year? { :008820}    Do you have sleep apnea, excessive snoring or daytime drowsiness?{ :455447}  {Outside tests to abstract? :917345}    {additional problems to add (Optional):332247}    Today's PHQ-2 Score:   PHQ-2 ( 1999 [employer]) 6/17/2019   PHQ-2 Score Incomplete     {PHQ-2 LOOK IN ASSESSMENTS (Optional) :283006}  Abuse: Current or Past(Physical, Sexual or Emotional)- {YES/NO/NA:989759}  Do you feel safe in your environment? {YES/NO/NA:345526}    Social History     Tobacco Use     Smoking status: Never Smoker     Smokeless tobacco: Never Used   Substance Use Topics     Alcohol use: Yes     If you drink alcohol do you typically have >3 drinks per day or >7 drinks per week? {ETOH :644405}                     Reviewed orders with patient.  Reviewed health maintenance and updated orders accordingly - {Yes/No:804839::\"Yes\"}  {Chronicprobdata (Optional):947714}    {Mammo Decision Support (Optional):141203}    Pertinent mammograms are reviewed under the imaging tab.  History of abnormal Pap smear: {PAP HX:723947}     Reviewed and updated as needed this visit by clinical staff         Reviewed and updated as needed this visit by Provider        {HISTORY OPTIONS (Optional):340670}    ROS:  { :904191}    OBJECTIVE:   There were no vitals taken for this visit.  EXAM:  {Exam Choices:031060}    {Diagnostic Test Results (Optional):465732::\"Diagnostic Test Results:\",\"Labs reviewed in Epic\"}    ASSESSMENT/PLAN:   {Diag " "Picklist:374324}    COUNSELING:   {FEMALE COUNSELING MESSAGES:956983::\"Reviewed preventive health counseling, as reflected in patient instructions\"}    There is no height or weight on file to calculate BMI.    {Weight Management Plan (ACO) Complete if BMI is abnormal-  Ages 18-64  BMI >24.9.  Age 65+ with BMI <23 or >30 (Optional):986820}     reports that she has never smoked. She has never used smokeless tobacco.  {Tobacco Cessation -- Complete if patient is a smoker (Optional):380227}    Counseling Resources:  ATP IV Guidelines  Pooled Cohorts Equation Calculator  Breast Cancer Risk Calculator  FRAX Risk Assessment  ICSI Preventive Guidelines  Dietary Guidelines for Americans, 2010  USDA's MyPlate  ASA Prophylaxis  Lung CA Screening    Cammie Barajas MD  [facility]  "

## 2019-06-17 NOTE — PROGRESS NOTES
SUBJECTIVE:   CC: Rosanna Leung is an 28 year old woman who presents for preventive health visit.     Healthy Habits:     Getting at least 3 servings of Calcium per day:  Yes    Bi-annual eye exam:  NO    Dental care twice a year:  NO    Sleep apnea or symptoms of sleep apnea:  None    Diet:  Regular (no restrictions)    Frequency of exercise:  2-3 days/week    Duration of exercise:  45-60 minutes    Taking medications regularly:  Yes    Medication side effects:  None    PHQ-2 Total Score: 0    Additional concerns today:  Yes (menstrual period. Rx for birth control )    CV: mother  of PE, also had DM2; maternal aunt has also had blood clots.  No early CAD or stroke in the family.  She states that immobility and surgery were the known risk factors for the blood clots on her mom's side of the family, but she is not sure if there may be any clotting disorder.  Rosanna has started running again; she thinks this is improving her asthma symptoms.   Malignancy: pap is due, denies h/o abnormal pap.  No breast or colon cancer in the family.  Bone health: no risk factors  Immunizations: tdap due.  Had HPV series.  Has not had pneumovax for asthma dx yet.  Sexual health: no new partners or concern for STI.  Periods have been irregular, as noted the past two visits.  At her last visit, she was having a prolonged period and I suggested ibuprofen; this helped the bleeding to stop, so she cancelled her ob/gyn appointment, but now the irregular bleeding has returned.  She had a normal TSH and CMP recently.  In the past, she tried OCPs, but her provider declined to refill given the family h/o clot.  She tried nexplanon but had side effects.    Depression screen: neg    Other: asthma symptoms are improved compared to last visit.  She has started running again, which she feels helps.  She continues on advair . She uses her albuterol with exercise.      Irregular periods:  the patient reports irregular periods over the past  several months.  Her last period had been in January.  She then had 9 days of bleeding a couple of weeks ago--as above, this resolved with NSAID use.   No breast tenderness or nausea; she did have a negative pregnancy test in November, which was the last time she had been sexually active.  Reports menarche at age 8 or 9.  States that in the past, her periods were sometimes irregular, but not always.  She did take OCPs for a time, and periods were regular on OCPs.  She also tried Nexplanon, but had it removed due to weight gain. OCPs were discontinued due to family h/o blood clots.        Today's PHQ-2 Score:   PHQ-2 ( 1999 Pfizer) 6/17/2019   Q1: Little interest or pleasure in doing things 0   Q2: Feeling down, depressed or hopeless 0   PHQ-2 Score 0   Q1: Little interest or pleasure in doing things Not at all   Q2: Feeling down, depressed or hopeless Not at all   PHQ-2 Score 0     Abuse: Current or Past(Physical, Sexual or Emotional)- No  Do you feel safe in your environment? Yes    Social History     Tobacco Use     Smoking status: Never Smoker     Smokeless tobacco: Never Used   Substance Use Topics     Alcohol use: Yes     If you drink alcohol do you typically have >3 drinks per day or >7 drinks per week? No    Alcohol Use 6/17/2019   Prescreen: >3 drinks/day or >7 drinks/week? No   No flowsheet data found.    Reviewed orders with patient.  Reviewed health maintenance and updated orders accordingly - Yes  Patient Active Problem List   Diagnosis     Morbid obesity (H)     History reviewed. No pertinent surgical history.    Social History     Tobacco Use     Smoking status: Never Smoker     Smokeless tobacco: Never Used   Substance Use Topics     Alcohol use: Yes     Family History   Problem Relation Age of Onset     Asthma Brother      Sickle Cell Anemia Cousin      Sickle Cell Anemia Maternal Aunt          Current Outpatient Medications   Medication Sig Dispense Refill     albuterol (PROAIR HFA) 108 (90 Base)  MCG/ACT inhaler Inhale 1-2 puffs into the lungs every 4 hours as needed for shortness of breath / dyspnea or wheezing 1 Inhaler 3     cholecalciferol (VITAMIN D3) 5000 units TABS tablet Take by mouth daily       fluticasone (FLOVENT HFA) 220 MCG/ACT inhaler Inhale 1 puff into the lungs 2 times daily 1 Inhaler 0     levonorgestrel-ethinyl estradiol (NORDETTE) 0.15-30 MG-MCG tablet Take 1 tablet by mouth daily       norethindrone (MICRONOR) 0.35 MG tablet Take 1 tablet (0.35 mg) by mouth daily 84 tablet 3     vitamin B-12 (CYANOCOBALAMIN) 100 MCG tablet Take 100 mcg by mouth daily       No Known Allergies    Mammogram not appropriate for this patient based on age.    Pertinent mammograms are reviewed under the imaging tab.  History of abnormal Pap smear: NO - age 21-29 PAP every 3 years recommended     Reviewed and updated as needed this visit by clinical staff  Tobacco  Allergies  Meds  Med Hx  Surg Hx  Fam Hx  Soc Hx        Reviewed and updated as needed this visit by Provider            Review of Systems   Constitutional: Negative for chills and fever.   HENT: Negative for congestion, ear pain, hearing loss and sore throat.    Eyes: Negative for pain and visual disturbance.   Respiratory: Positive for shortness of breath. Negative for cough.    Cardiovascular: Negative for chest pain, palpitations and peripheral edema.   Gastrointestinal: Negative for abdominal pain, constipation, diarrhea, heartburn, hematochezia and nausea.   Genitourinary: Negative for dysuria, frequency, genital sores, hematuria and urgency.   Musculoskeletal: Negative for arthralgias, joint swelling and myalgias.   Skin: Negative for rash.   Neurological: Negative for dizziness, weakness, headaches and paresthesias.   Psychiatric/Behavioral: Positive for mood changes. The patient is not nervous/anxious.           OBJECTIVE:   /74 (BP Location: Right arm, Patient Position: Sitting, Cuff Size: Adult Large)   Pulse 82   Temp 98  F  "(36.7  C) (Oral)   Resp 18   Ht 1.6 m (5' 3\")   Wt 109.3 kg (241 lb)   LMP 05/29/2019 (Exact Date)   SpO2 99%   BMI 42.69 kg/m    Physical Exam  GENERAL: morbidly obese, alert and no distress  EYES: Eyes grossly normal to inspection, PERRL and conjunctivae and sclerae normal  HENT: ear canals and TM's normal, nose and mouth without ulcers or lesions  NECK: no adenopathy, no asymmetry, masses, or scars and thyroid normal to palpation  RESP: lungs clear to auscultation - no rales, rhonchi or wheezes  BREAST: normal without masses, tenderness or nipple discharge and no palpable axillary masses or adenopathy  CV: regular rate and rhythm, normal S1 S2, no S3 or S4, no murmur, click or rub, no peripheral edema and peripheral pulses strong  ABDOMEN: soft, nontender, no hepatosplenomegaly, no masses and bowel sounds normal   (female): normal female external genitalia, normal urethral meatus, vaginal mucosa pink, moist, well rugated, no discharge; bimanual exam limited by body habitus.  MS: no gross musculoskeletal defects noted, no edema  SKIN: no suspicious lesions or rashes; hirsute    NEURO: Normal strength and tone, mentation intact and speech normal  PSYCH: mentation appears normal, affect normal/bright        ASSESSMENT/PLAN:   1. Routine general medical examination at a health care facility  CV: encouraged her to continue with running; will check on A1c and lipid panel given family h/o DM, obesity, and suspicion for PCOS  Malignancy: pap done.   Immunizations: tdap done; will have her back for pneumovax    - PAP imaged thin layer screen reflex to HPV if ASCUS - recommended age 25 - 29 years  - TDAP, IM (10 - 64 YRS) - Adacel  - Hemoglobin A1c    2. Irregular menses  Discussed that I would agree that, given a strong family h/o clotting disorder, she should have further evaluation prior to being on estrogen-containing contraception to regulate periods.  Will give her micronor for now.  Will evaluate further " "with pelvic ultrasound.  I still recommended OB/GYN consultation; I suspect anovulatory bleeding as the most likely etiology, given irregular bleeding, body habitus, hirsutism.    - norethindrone (MICRONOR) 0.35 MG tablet; Take 1 tablet (0.35 mg) by mouth daily  Dispense: 84 tablet; Refill: 3  - US Pelvic Complete w Transvaginal; Future    3. Morbid obesity (H)  As above, continue with excellent running exercise    4. Family history of clotting disorder  I discussed testing for familial clotting disorders.  I discussed that if the work up is negative, then this does not mean that she will never have a blood clot, and that estrogen-containing contraception can still cause clots, but I would be more comfortable prescribing combined OCPs for her with this knowledge.  She was agreeable to testing.    - Factor 5 leiden mutation analysis  - INR  - Partial thromboplastin time  - Lupus Anticoagulant Panel  - Protein S Antigen Free  - Protein C chromogenic  - Beta 2 Glycoprotein Antibodies IGG IGM    COUNSELING:  Reviewed preventive health counseling, as reflected in patient instructions  3  Estimated body mass index is 42.69 kg/m  as calculated from the following:    Height as of this encounter: 1.6 m (5' 3\").    Weight as of this encounter: 109.3 kg (241 lb).         reports that she has never smoked. She has never used smokeless tobacco.      Counseling Resources:  ATP IV Guidelines  Pooled Cohorts Equation Calculator  Breast Cancer Risk Calculator  FRAX Risk Assessment  ICSI Preventive Guidelines  Dietary Guidelines for Americans, 2010  USDA's MyPlate  ASA Prophylaxis  Lung CA Screening    Cammie Barajas MD  [facility]  "

## 2019-06-18 LAB
B2 GLYCOPROT1 IGG SERPL IA-ACNC: 0.6 U/ML
B2 GLYCOPROT1 IGM SERPL IA-ACNC: 1.3 U/ML
CHOLEST SERPL-MCNC: 305 MG/DL
HDLC SERPL-MCNC: 72 MG/DL
LDLC SERPL CALC-MCNC: 219 MG/DL
NONHDLC SERPL-MCNC: 233 MG/DL
PROLACTIN SERPL-MCNC: 17 UG/L (ref 3–27)
PROT C ACT/NOR PPP CHRO: 146 % (ref 70–170)
TRIGL SERPL-MCNC: 72 MG/DL

## 2019-06-19 LAB
COPATH REPORT: NORMAL
PAP: NORMAL
PROT S FREE AG ACT/NOR PPP IA: 126 % (ref 55–125)

## 2019-06-20 LAB
COPATH REPORT: NORMAL
LA PPP-IMP: NEGATIVE

## 2019-06-21 DIAGNOSIS — E78.5 DYSLIPIDEMIA: Primary | ICD-10-CM

## 2019-06-21 DIAGNOSIS — Z82.49 FAMILY HISTORY OF BLOOD CLOTS: ICD-10-CM

## 2019-07-23 ENCOUNTER — TELEPHONE (OUTPATIENT)
Dept: FAMILY MEDICINE | Facility: CLINIC | Age: 29
End: 2019-07-23

## 2019-07-23 NOTE — TELEPHONE ENCOUNTER
Panel Management Review      Patient has the following on her problem list:     Asthma review     ACT Total Scores 6/5/2019   ACT TOTAL SCORE (Goal Greater than or Equal to 20) 9   In the past 12 months, how many times did you visit the emergency room for your asthma without being admitted to the hospital? 0   In the past 12 months, how many times were you hospitalized overnight because of your asthma? 0      1. Is Asthma diagnosis on the Problem List? Yes    2. Is Asthma listed on Health Maintenance? No   3. Patient is due for:  ACT      Composite cancer screening  Chart review shows that this patient is due/due soon for the following None  Summary:    Patient is due/failing the following:   ACT    Action needed:   Patient needs to do ACT.    Type of outreach:    Phone, left message for patient to call back.  and Sent Waze message.    Questions for provider review:    None                                                                                                                                    Rosanna Shepherd MA on 7/23/2019 at 1:05 PM       Chart routed to Care Team, First attempt .

## 2019-07-30 ENCOUNTER — OFFICE VISIT (OUTPATIENT)
Dept: OBGYN | Facility: CLINIC | Age: 29
End: 2019-07-30

## 2019-07-30 VITALS
HEIGHT: 63 IN | DIASTOLIC BLOOD PRESSURE: 79 MMHG | WEIGHT: 244 LBS | HEART RATE: 86 BPM | BODY MASS INDEX: 43.23 KG/M2 | SYSTOLIC BLOOD PRESSURE: 112 MMHG

## 2019-07-30 DIAGNOSIS — N93.8 DUB (DYSFUNCTIONAL UTERINE BLEEDING): Primary | ICD-10-CM

## 2019-07-30 PROCEDURE — 99203 OFFICE O/P NEW LOW 30 MIN: CPT | Performed by: OBSTETRICS & GYNECOLOGY

## 2019-07-30 RX ORDER — LEVONORGESTREL AND ETHINYL ESTRADIOL 0.15-0.03
1 KIT ORAL DAILY
Qty: 84 TABLET | Refills: 3 | Status: SHIPPED | OUTPATIENT
Start: 2019-07-30 | End: 2020-06-30

## 2019-07-30 SDOH — HEALTH STABILITY: MENTAL HEALTH: HOW OFTEN DO YOU HAVE A DRINK CONTAINING ALCOHOL?: MONTHLY OR LESS

## 2019-07-30 SDOH — HEALTH STABILITY: MENTAL HEALTH: HOW MANY STANDARD DRINKS CONTAINING ALCOHOL DO YOU HAVE ON A TYPICAL DAY?: 1 OR 2

## 2019-07-30 SDOH — HEALTH STABILITY: MENTAL HEALTH: HOW OFTEN DO YOU HAVE 6 OR MORE DRINKS ON ONE OCCASION?: NEVER

## 2019-07-30 ASSESSMENT — MIFFLIN-ST. JEOR: SCORE: 1800.91

## 2019-07-30 NOTE — PROGRESS NOTES
Abnormal bleeding  HPI:  Rosanna Leung is a 28 year old female is a   P 0.  Patient's last menstrual period was 07/30/2019.  Gets monthly menses monthly since about age 9 but bleeds for approximately 15 days total.  Had been put on a birth control pill at [facility] for her abnormal menses and seemed okay but then saw Dr. Barajas who put her on progesterone only OCP due to family history of clotting disorder after events.  Family history section was updated at today's visit and patient has had normal labs and has no clotting risk factors noted on those labs.    Patients records are available and reviewed at today's visit.  Patient is not sexually active.    Currently has been bleeding for 16 days.  States that she took 2 tablets on one day and that stopped menses but then the next day did her exercises and menses started back again.  Has not had a pelvic ultrasound done.  Last Pap smear was normal.    Past Medical History:   Diagnosis Date     NO ACTIVE PROBLEMS        Past Surgical History:   Procedure Laterality Date     implanon  10/17-4/18    removed for weight gain and mood     NO HISTORY OF SURGERY         Family History   Problem Relation Age of Onset     Clotting Disorder Mother      Pulmonary Embolism Mother         after surgery     Asthma Brother      Sickle Cell Anemia Cousin      Sickle Cell Anemia Maternal Aunt      Clotting Disorder Maternal Aunt         DVT ?after surgery     Clotting Disorder Maternal Aunt         after MVA       Social History     Socioeconomic History     Marital status: Single     Spouse name: None     Number of children: None     Years of education: None     Highest education level: None   Occupational History     None   Social Needs     Financial resource strain: None     Food insecurity:     Worry: None     Inability: None     Transportation needs:     Medical: None     Non-medical: None   Tobacco Use     Smoking status: Never Smoker     Smokeless tobacco: Never  "Used   Substance and Sexual Activity     Alcohol use: Yes     Frequency: Monthly or less     Drinks per session: 1 or 2     Binge frequency: Never     Drug use: Never     Sexual activity: Not Currently     Partners: Male     Birth control/protection: Pill   Lifestyle     Physical activity:     Days per week: None     Minutes per session: None     Stress: None   Relationships     Social connections:     Talks on phone: None     Gets together: None     Attends Sikh service: None     Active member of club or organization: None     Attends meetings of clubs or organizations: None     Relationship status: None     Intimate partner violence:     Fear of current or ex partner: None     Emotionally abused: None     Physically abused: None     Forced sexual activity: None   Other Topics Concern     None   Social History Narrative     None       Allergies: Patient has no known allergies.    Current Outpatient Medications   Medication Sig Dispense Refill     levonorgestrel-ethinyl estradiol (NORDETTE) 0.15-30 MG-MCG tablet Take 1 tablet by mouth daily 84 tablet 3     albuterol (PROAIR HFA) 108 (90 Base) MCG/ACT inhaler Inhale 1-2 puffs into the lungs every 4 hours as needed for shortness of breath / dyspnea or wheezing 1 Inhaler 3     cholecalciferol (VITAMIN D3) 5000 units TABS tablet Take by mouth daily       fluticasone (FLOVENT HFA) 220 MCG/ACT inhaler Inhale 1 puff into the lungs 2 times daily 1 Inhaler 0     vitamin B-12 (CYANOCOBALAMIN) 100 MCG tablet Take 100 mcg by mouth daily         ROS:  C: NEGATIVE for fever, chills, change in weight  R: NEGATIVE for significant cough or SOB  CV: NEGATIVE for chest pain, palpitations or peripheral edema  GI: NEGATIVE for nausea, abdominal pain, heartburn, or change in bowel habits  : NEGATIVE for frequency, dysuria, hematuria, vaginal discharge  P: NEGATIVE for changes in mood or affect    EXAM:  Blood pressure 112/79, pulse 86, height 1.6 m (5' 3\"), weight 110.7 kg (244 " lb), last menstrual period 07/30/2019.  BMI= Body mass index is 43.22 kg/m .  Patient's last menstrual period was 07/30/2019.  General - pleasant female in no acute distress.  Neurological - alert and oriented X 3  Psychiatric - normal mood and affect    No other physical examination was performed today as we spent over 50% of today's 30 minute visit in face-to-face discussion and counseling about irregular menstrual bleeding at her age.    ASSESSMENT/PLAN:  (N93.8) DUB (dysfunctional uterine bleeding)  (primary encounter diagnosis)  Comment: On progesterone OCP  Plan: US Pelvic Complete w Transvaginal,         levonorgestrel-ethinyl estradiol (NORDETTE)         0.15-30 MG-MCG tablet        We will have her stop the progesterone only OCP as that is probably why she is bleeding for the last 16 days.  Discussed that pill is very picky and difficult to not bleed and less she is breast-feeding.    Because she has been checked for clotting factors and negative, discussed she can take birth control pill and changed her pill to the one from [facility].  Reviewed she should not be bleeding on the placebo week by the third pack.  If she ever has surgery in the future, she is to let them know about family history of clotting after events.    The pelvic ultrasound was ordered and will let her know results when available.  She was concerned about fibroids and ability to get pregnant in the future and discussed ultrasound to look at ovaries and uterus.      MAIDA MEI

## 2019-07-30 NOTE — PATIENT INSTRUCTIONS
Start new birthcontrol and should be bleeding just on last week of pack by 3rd month    Call [phone] to schedule ultrasound for check for ovarian cysts and fibroids. [facility]

## 2019-07-31 NOTE — TELEPHONE ENCOUNTER
Panel Management Review      Patient has the following on her problem list:     Asthma review     ACT Total Scores 6/5/2019   ACT TOTAL SCORE (Goal Greater than or Equal to 20) 9   In the past 12 months, how many times did you visit the emergency room for your asthma without being admitted to the hospital? 0   In the past 12 months, how many times were you hospitalized overnight because of your asthma? 0      1. Is Asthma diagnosis on the Problem List? Yes    2. Is Asthma listed on Health Maintenance? No   3. Patient is due for:  ACT      Composite cancer screening  Chart review shows that this patient is due/due soon for the following None  Summary:    Patient is due/failing the following:   ACT    Action needed:   Patient needs to do ACT.    Type of outreach:    Phone, left message for patient to call back.     Questions for provider review:    None                                                                                                                                    Rosanna Shepherd MA on 7/31/2019 at 4:24 PM       Chart routed to Care Team, Second attempt .

## 2019-08-01 DIAGNOSIS — J45.21 MILD INTERMITTENT ASTHMA WITH EXACERBATION: ICD-10-CM

## 2019-08-01 NOTE — TELEPHONE ENCOUNTER
"Routing to PCP to advise  [facility]    Last Written Prescription Date:  4/8/2019  Last Fill Quantity: 1,  # refills: 0   Last office visit: 4/8/2019 with prescribing provider:     Future Office Visit:    Requested Prescriptions   Pending Prescriptions Disp Refills     FLOVENT  MCG/ACT inhaler [Pharmacy Med Name: FLOVENT  MCG ORAL INH 120INH] 12 g 0     Sig: INHALE 1 PUFF INTO THE LUNGS TWICE DAILY       Inhaled Steroids Protocol Failed - 8/1/2019  8:47 AM        Failed - Asthma control assessment score within normal limits in last 6 months     Please review ACT score.           Passed - Patient is age 12 or older        Passed - Medication is active on med list        Passed - Recent (6 mo) or future (30 days) visit within the authorizing provider's specialty     Patient had office visit in the last 6 months or has a visit in the next 30 days with authorizing provider or within the authorizing provider's specialty.  See \"Patient Info\" tab in inbasket, or \"Choose Columns\" in Meds & Orders section of the refill encounter.              "

## 2019-08-05 RX ORDER — FLUTICASONE PROPIONATE 220 UG/1
AEROSOL, METERED RESPIRATORY (INHALATION)
Qty: 12 G | Refills: 0 | Status: SHIPPED | OUTPATIENT
Start: 2019-08-05 | End: 2019-11-14

## 2019-08-12 ENCOUNTER — MYC MEDICAL ADVICE (OUTPATIENT)
Dept: FAMILY MEDICINE | Facility: CLINIC | Age: 29
End: 2019-08-12

## 2019-08-12 DIAGNOSIS — J20.9 ACUTE BRONCHITIS WITH SYMPTOMS > 10 DAYS: ICD-10-CM

## 2019-11-05 ENCOUNTER — HEALTH MAINTENANCE LETTER (OUTPATIENT)
Age: 29
End: 2019-11-05

## 2019-11-12 ENCOUNTER — TELEPHONE (OUTPATIENT)
Dept: FAMILY MEDICINE | Facility: CLINIC | Age: 29
End: 2019-11-12

## 2019-11-12 NOTE — TELEPHONE ENCOUNTER
Panel Management Review      Patient has the following on her problem list:     Asthma review     ACT Total Scores 6/5/2019   ACT TOTAL SCORE (Goal Greater than or Equal to 20) 9   In the past 12 months, how many times did you visit the emergency room for your asthma without being admitted to the hospital? 0   In the past 12 months, how many times were you hospitalized overnight because of your asthma? 0      1. Is Asthma diagnosis on the Problem List? Yes    2. Is Asthma listed on Health Maintenance? Yes    3. Patient is due for:  ACT      Composite cancer screening  Chart review shows that this patient is due/due soon for the following None  Summary:    Patient is due/failing the following:   ACT    Action needed:   Patient needs to do ACT.    Type of outreach:    Phone, left message for patient to call back.     Questions for provider review:    None                                                                                                                                    Sandy Miles CMA on 11/12/2019 at 4:38 PM

## 2019-11-14 ENCOUNTER — OFFICE VISIT (OUTPATIENT)
Dept: URGENT CARE | Facility: URGENT CARE | Age: 29
End: 2019-11-14

## 2019-11-14 VITALS
BODY MASS INDEX: 43.41 KG/M2 | HEIGHT: 63 IN | TEMPERATURE: 97.7 F | WEIGHT: 245 LBS | OXYGEN SATURATION: 100 % | HEART RATE: 84 BPM | DIASTOLIC BLOOD PRESSURE: 77 MMHG | SYSTOLIC BLOOD PRESSURE: 117 MMHG

## 2019-11-14 DIAGNOSIS — J45.21 MILD INTERMITTENT ASTHMA WITH EXACERBATION: ICD-10-CM

## 2019-11-14 PROCEDURE — 99213 OFFICE O/P EST LOW 20 MIN: CPT | Performed by: FAMILY MEDICINE

## 2019-11-14 RX ORDER — ALBUTEROL SULFATE 90 UG/1
1-2 AEROSOL, METERED RESPIRATORY (INHALATION) EVERY 4 HOURS PRN
Qty: 1 INHALER | Refills: 1 | Status: SHIPPED | OUTPATIENT
Start: 2019-11-14 | End: 2021-03-04

## 2019-11-14 RX ORDER — FLUTICASONE PROPIONATE 220 UG/1
1 AEROSOL, METERED RESPIRATORY (INHALATION) 2 TIMES DAILY
Qty: 12 G | Refills: 1 | Status: SHIPPED | OUTPATIENT
Start: 2019-11-14 | End: 2020-03-25

## 2019-11-14 ASSESSMENT — ASTHMA QUESTIONNAIRES
QUESTION_2 LAST FOUR WEEKS HOW OFTEN HAVE YOU HAD SHORTNESS OF BREATH: MORE THAN ONCE A DAY
QUESTION_5 LAST FOUR WEEKS HOW WOULD YOU RATE YOUR ASTHMA CONTROL: SOMEWHAT CONTROLLED
QUESTION_1 LAST FOUR WEEKS HOW MUCH OF THE TIME DID YOUR ASTHMA KEEP YOU FROM GETTING AS MUCH DONE AT WORK, SCHOOL OR AT HOME: SOME OF THE TIME
QUESTION_4 LAST FOUR WEEKS HOW OFTEN HAVE YOU USED YOUR RESCUE INHALER OR NEBULIZER MEDICATION (SUCH AS ALBUTEROL): NOT AT ALL
ACT_TOTALSCORE: 14
QUESTION_3 LAST FOUR WEEKS HOW OFTEN DID YOUR ASTHMA SYMPTOMS (WHEEZING, COUGHING, SHORTNESS OF BREATH, CHEST TIGHTNESS OR PAIN) WAKE YOU UP AT NIGHT OR EARLIER THAN USUAL IN THE MORNING: TWO OR THREE NIGHTS A WEEK

## 2019-11-14 ASSESSMENT — MIFFLIN-ST. JEOR: SCORE: 1800.44

## 2019-11-14 NOTE — LETTER
November 14, 2019      Rosanna MATA Aimua  [address] [place] [address]        To Whom It May Concern:    Rosanna was evaluated in our clinic today for a medical condition, she informs us this affected her ability to go to work last Weds on the 6th. Please excuse her from missed work duties.       Sincerely,        Yandel Elam MD

## 2019-11-15 ASSESSMENT — ASTHMA QUESTIONNAIRES: ACT_TOTALSCORE: 14

## 2019-11-15 NOTE — PATIENT INSTRUCTIONS
Take flovent as prescribed daily    For periods of shortness of breath or wheezing: albuterol 2 puffs every 4 hours as needed

## 2019-11-15 NOTE — PROGRESS NOTES
"Subjective:   Rosanna Leung is a 29 year old female who presents for   Chief Complaint   Patient presents with     Urgent Care     Pt in clinic to have eval for SOB and med refill.     Asthma     Patient has been on/off with flovent use. Has not been on it in 2-3 months but thinks the cold weather is making her breathing more difficult. Currently she has slight chestness. She denies rapid heart beating or palpitations. Denies hemoptysis. No pain of the calves or lower extremity swelling.   She is not currently ill at this time, denies cough. She reports last week Tuesday when she went to donate blood she had to call in to work ill as she was having some tight breathing.     Mother's side does have history of blood clots, she is on an OCP.     She does not smoke or vape.     Patient Active Problem List    Diagnosis Date Noted     Morbid obesity (H) 06/17/2019     Priority: Medium       Current Outpatient Medications   Medication     albuterol (PROAIR HFA) 108 (90 Base) MCG/ACT inhaler     fluticasone (FLOVENT HFA) 220 MCG/ACT inhaler     levonorgestrel-ethinyl estradiol (NORDETTE) 0.15-30 MG-MCG tablet     cholecalciferol (VITAMIN D3) 5000 units TABS tablet     vitamin B-12 (CYANOCOBALAMIN) 100 MCG tablet     No current facility-administered medications for this visit.      ROS:  As above per HPI    Objective:   /77   Pulse 84   Temp 97.7  F (36.5  C) (Oral)   Ht 1.6 m (5' 3\")   Wt 111.1 kg (245 lb)   SpO2 100%   BMI 43.40 kg/m  , Body mass index is 43.4 kg/m .  Gen:  NAD, well-nourished, sitting in chair comfortably  HEENT: EOMI, sclera anicteric, Head normocephalic, ; nares patent; moist mucous membranes  Neck: trachea midline, no thyromegaly  CV:  Hemodynamically stable, RRR  Pulm:  no increased work of breathing , CTAB, no wheezes/rales/rhonchi   ABD: soft, non-distended  Extrem: no cyanosis, edema or clubbing  Skin: no obvious rashes or abnormalities  Psych: Euthymic, linear thoughts, normal rate of " speech    No results found for any visits on 11/14/19.    Assessment & Plan:   Rosanna Leung, 29 year old female who presents with:    Mild intermittent asthma with exacerbation  ACT score 14 (previously 9).   We will continue on her previous Flovent  dose of 1 puff twice a day with as needed albuterol when she develops wheezing or shortness of breath.  I recommended she follow-up in 1 month for annual physical exam we can follow-up on her asthma symptoms at that time.  She currently has no infection and has a normal lung exam consistent with her stable vital signs.  - fluticasone (FLOVENT HFA) 220 MCG/ACT inhaler  Dispense: 12 g; Refill: 1  - albuterol (PROAIR HFA) 108 (90 Base) MCG/ACT inhaler  Dispense: 1 Inhaler; Refill: 1  ACT score 14 (previously 9)    Yandel Elam MD   [facility]    The use of Dragon/PlayCafe dictation services may have been used to construct the content in this note; any grammatical or spelling errors are non-intentional. Please contact the author of this note directly if you are in need of any clarification.

## 2020-03-25 ENCOUNTER — TELEPHONE (OUTPATIENT)
Dept: FAMILY MEDICINE | Facility: CLINIC | Age: 30
End: 2020-03-25

## 2020-03-25 ENCOUNTER — VIRTUAL VISIT (OUTPATIENT)
Dept: FAMILY MEDICINE | Facility: CLINIC | Age: 30
End: 2020-03-25
Payer: COMMERCIAL

## 2020-03-25 DIAGNOSIS — J45.21 MILD INTERMITTENT ASTHMA WITH EXACERBATION: ICD-10-CM

## 2020-03-25 PROCEDURE — 99441 ZZC PHYSICIAN TELEPHONE EVALUATION 5-10 MIN: CPT | Mod: TEL | Performed by: NURSE PRACTITIONER

## 2020-03-25 RX ORDER — FLUTICASONE PROPIONATE 220 UG/1
1 AEROSOL, METERED RESPIRATORY (INHALATION) 2 TIMES DAILY
Qty: 12 G | Refills: 3 | Status: SHIPPED | OUTPATIENT
Start: 2020-03-25 | End: 2021-03-04

## 2020-03-25 NOTE — PROGRESS NOTES
"Rosanna Leung is a 29 year old female who is being evaluated via a billable telephone visit.      The patient has been notified of following:     \"This telephone visit will be conducted via a call between you and your physician/provider. We have found that certain health care needs can be provided without the need for a physical exam.  This service lets us provide the care you need with a short phone conversation.  If a prescription is necessary we can send it directly to your pharmacy.  If lab work is needed we can place an order for that and you can then stop by our lab to have the test done at a later time.    If during the course of the call the physician/provider feels a telephone visit is not appropriate, you will not be charged for this service.\"     Rosanna Leung complains of    Chief Complaint   Patient presents with     Asthma     and questions about Covid-19       1-2 years ago diagnosed with asthma.  She has not been using her inhalers for the past 3 months.  She is calling in today for concerns about her asthma and coronavirus/Covid 19.  She was diagnosed with asthma when she was having problems breathing.      Flovent HFA:  She has not used this for the past 6 months.  The last time she used her albuterol inhaler was 3 months ago.    She feels like she is doing great overall today.     I have reviewed and updated the patient's Past Medical History, Social History, Family History and Medication List.    ALLERGIES  Patient has no known allergies.      Assessment/Plan:    (J45.21) Mild intermittent asthma with exacerbation  Comment: controlled  Plan: fluticasone (FLOVENT HFA) 220 MCG/ACT inhaler        I had a long discussion with the patient about her asthma diagnosis and her inhaler use.  For today, it sounds like her airway management/asthma is under excellent control.  She has not used her albuterol inhaler for at least 3 months and her Flovent last time she is at was the medical.  I did tell her to " use routine coronavirus precautions and to watch for any respiratory illness closely.  In the event that she develops any coughing, shortness of breath etc., I do want her to start the Flovent immediately.  She is to monitor for progression of symptoms if she is worried about coronavirus etc.  I did also talk to her about it is okay to use the albuterol inhaler as needed for cough or wheezing to monitor her asthma and respiratory illness if that develops.  For now, I think she is at normal risk of any coronavirus illness or exposure and she was reassured.  I also talked to her about the importance of healthy diet, nutrition, rest, washing her hands etc.  She agrees and understands.    Phone call duration:  9 minutes      Pat JAVED CNP

## 2020-03-25 NOTE — TELEPHONE ENCOUNTER
Patient called today.    Patient is requesting to speak to Pat ANAYA at  Clinic on asthma history and COVID 19 general questions.    Possible telephone encounter?    Please contact patient.    Thank you.    Central Scheduling  [place]

## 2020-04-26 ENCOUNTER — NURSE TRIAGE (OUTPATIENT)
Dept: NURSING | Facility: CLINIC | Age: 30
End: 2020-04-26

## 2020-04-26 ENCOUNTER — VIRTUAL VISIT (OUTPATIENT)
Dept: URGENT CARE | Facility: CLINIC | Age: 30
End: 2020-04-26
Payer: COMMERCIAL

## 2020-04-26 DIAGNOSIS — R42 DIZZINESS: Primary | ICD-10-CM

## 2020-04-26 PROCEDURE — 99214 OFFICE O/P EST MOD 30 MIN: CPT | Mod: 95 | Performed by: STUDENT IN AN ORGANIZED HEALTH CARE EDUCATION/TRAINING PROGRAM

## 2020-04-26 NOTE — PROGRESS NOTES
I was present during the key/critical portion of the telephone visit. The patient acknowledged that I participated in the telephone conversation. I discussed the case with the resident and agree with the note as documented by the resident.     Symptoms consistent with acute vestibular syndrome. Low risk for stroke generally. Discussed possibility of a dangerous cause of her symptoms with Rosanna and the possibility of in person evaluation today for exam to try to find a more reassuring cause (vestibular neuritis, BPPV). Given that her symptoms are improving daily I think it is also reasonable to continue to monitor at home. Rosanna agreed with self-monitoring plan and agrees to go to ED/UC for evaluation if symptoms worsen again.     I personally spent a total of 12 minutes speaking with Rosanna Leung during today s visit.     Alpesh Dickerson MD  4/26/2020 at 1:37 PM

## 2020-04-26 NOTE — LETTER
April 26, 2020      Rosanna Leung  [address] [place] [address]        To Whom It May Concern:    Rosanna Leung was seen in our clinic. She may return to work when she is feeling better. Please excuse her from work on 4/27/2020 and later if needed.      Sincerely,    Jesi Duque MD    [facility] CARE PROVIDER

## 2020-04-26 NOTE — PROGRESS NOTES
"Rosanna Leung is a 29 year old female who is being evaluated via a billable telephone visit.      The patient has been notified of following:     \"This telephone visit will be conducted via a call between you and your physician/provider. We have found that certain health care needs can be provided without the need for a physical exam.  This service lets us provide the care you need with a short phone conversation.  If a prescription is necessary we can send it directly to your pharmacy.  If lab work is needed we can place an order for that and you can then stop by our lab to have the test done at a later time.    Telephone visits are billed at different rates depending on your insurance coverage. During this emergency period, for some insurers they may be billed the same as an in-person visit.  Please reach out to your insurance provider with any questions.    If during the course of the call the physician/provider feels a telephone visit is not appropriate, you will not be charged for this service.\"    Patient has given verbal consent for Telephone visit?  Yes    How would you like to obtain your AVS? Lizzetht    Subjective     Rosanna Leung is a 29 year old female who presents to clinic today for the following health issues:    HPI  Acute Illness   Acute illness concerns: Symptoms started on Thursday morning while she was at work. She started to feel dizzy and it became worse throughout the day.   Vomited twice Thursday, once Friday, twice yesterday. Non bloody. No abdominal pain.  Describes dizziness as room spinning whenever she gets up or moves her head too quickly in either direction or upwards. With prolonged walking, she will feel nauseated and possibly throw up.  Overall, the dizziness has improved a little every day. She has not vomited today.  She tried dramamine with some mild improvement in symptoms.  She is tolerating PO today.  Denies focal pain, hearing change, ringing in ears, runny nose, sore " throat, nasal congestion, cough, shortness of breath, heart palpitations, numbness/tingling/weakness, vision change, muscle weakness, diarrhea.  Denies possibility for pregnancy.     Patient Active Problem List   Diagnosis     Morbid obesity (H)     Past Surgical History:   Procedure Laterality Date     implanon  10/17-4/18    removed for weight gain and mood     NO HISTORY OF SURGERY         Social History     Tobacco Use     Smoking status: Never Smoker     Smokeless tobacco: Never Used   Substance Use Topics     Alcohol use: Yes     Frequency: Monthly or less     Drinks per session: 1 or 2     Binge frequency: Never     Family History   Problem Relation Age of Onset     Clotting Disorder Mother      Pulmonary Embolism Mother         after surgery     Asthma Brother      Sickle Cell Anemia Cousin      Sickle Cell Anemia Maternal Aunt      Clotting Disorder Maternal Aunt         DVT ?after surgery     Clotting Disorder Maternal Aunt         after MVA           Reviewed and updated as needed this visit by Provider         Review of Systems   See HPI.    Objective   Reported vitals:  There were no vitals taken for this visit.   healthy, alert and no distress  PSYCH: Alert and oriented times 3; coherent speech, normal   rate and volume, able to articulate logical thoughts, able   to abstract reason, no tangential thoughts, no hallucinations   or delusions  Her affect is normal  RESP: No cough, no audible wheezing, able to talk in full sentences  Remainder of exam unable to be completed due to telephone visits    Diagnostic Test Results:  Labs reviewed in Epic        Assessment/Plan:  1. Dizziness  30 yo F with PMHx intermittent asthma and obesity calling for dizziness and emesis. I considered a broad differential diagnosis including, but not limited to: BPPV, vestibular neuritis, labyrinthitis, migraine, arrhythmia, orthostatic hypotension. Meniere's and labyrinthitis less likely without hearing loss/tinnitus. No  focal neuro symptoms to suggest acute CVA or TIA, artery dissection. Her history does sound c/w peripheral etiology given positional nature and intermittent symptoms, although we did discuss the other, although low likelihood, etiologies. She would like to self-treat at home and I feel this is reasonable unless she were to have any worsening or new symptoms or lack of ongoing improvement, in which case she states she will  Be seen in the urgent care. OK to using dramamine if needed for dizziness. She is in agreement with this plan.       Phone call duration:  40 minutes    Jesi Duque MD    I staffed this patient with Dr. Dickerson, attending physician.

## 2020-04-26 NOTE — PROGRESS NOTES
"Rosanna Leung is a 29 year old female who is being evaluated via a billable telephone visit.      The patient has been notified of following:     \"This telephone visit will be conducted via a call between you and your physician/provider. We have found that certain health care needs can be provided without the need for a physical exam.  This service lets us provide the care you need with a short phone conversation.  If a prescription is necessary we can send it directly to your pharmacy.  If lab work is needed we can place an order for that and you can then stop by our lab to have the test done at a later time.    Telephone visits are billed at different rates depending on your insurance coverage. During this emergency period, for some insurers they may be billed the same as an in-person visit.  Please reach out to your insurance provider with any questions.    If during the course of the call the physician/provider feels a telephone visit is not appropriate, you will not be charged for this service.\"    Patient has given verbal consent for Telephone visit?  {YES-NO  Default Yes:4444::\"Yes\"}    How would you like to obtain your AVS? {AVS Preference:252752}    Subjective     Rosanna Leung is a 29 year old female who presents to clinic today for the following health issues:    HPI  Dizziness  Onset: ***    Description:   Do you feel faint:  { :616634}  Does it feel like the surroundings (bed, room) are moving: { :499119}  Unsteady/off balance: { :842410}  Have you passed out or fallen: { :234457}    Intensity: {.:556951}    Progression of Symptoms:  {.:688826}    Accompanying Signs & Symptoms:  Heart palpitations: { :575417}  Nausea, vomiting: { :485106}  Weakness in arms or legs: no   Fatigue: { :693249}  Vision or speech changes: { :374184}  Ringing in ears (Tinnitus): { :263988}  Hearing Loss: { :864186}    History:   Head trauma/concussion hx: { :560446}  Previous similar symptoms: { :146975}  Recent bleeding " "history: { :888937}    Precipitating factors:   Worse with activity or head movement: { :552562}  Any new medications (BP?): { :238897}  Alcohol/drug abuse/withdrawal: { :508479}    Alleviating factors:   Does staying in a fixed position give relief:  { :433153}    Therapies Tried and outcome: ***  {PEDS Chronic and Acute Problems (Optional):539843}     {additonal problems for provider to add (Optional):004411}    {HIST REVIEW/ LINKS 2 (Optional):261009}    Reviewed and updated as needed this visit by Provider  Tobacco  Allergies  Meds  Problems  Med Hx  Surg Hx  Fam Hx         Review of Systems   {ROS COMP (Optional):060012}       Objective   Reported vitals:  There were no vitals taken for this visit.   {GENERAL APPEARANCE:50::\"healthy\",\"alert\",\"no distress\"}  PSYCH: Alert and oriented times 3; coherent speech, normal   rate and volume, able to articulate logical thoughts, able   to abstract reason, no tangential thoughts, no hallucinations   or delusions  Her affect is { :7550618::\"normal\"}  RESP: No cough, no audible wheezing, able to talk in full sentences  Remainder of exam unable to be completed due to telephone visits    {Diagnostic Test Results (Optional):671068::\"Diagnostic Test Results:\",\"Labs reviewed in Epic\"}        Assessment/Plan:  {Diagnosis, Associated Orders and Comment:807574}    No follow-ups on file.      Phone call duration:  *** minutes    {signature options:477305}        "

## 2020-04-26 NOTE — TELEPHONE ENCOUNTER
Rosanna calls and says that she is dizzy and has been vomiting. Pt. Says that she has already spoken to a [facility] Nurse and was prescribed Dramamine. Pt. Says that she took Dramamine at 9 PM this bobbi but vomited hours after this. Pt. Was then conferenced with  , for assistance in scheduling a virtual  visit for Sunday am. COVID 19 Nurse Triage Plan/Patient Instructions    Please be aware that novel coronavirus (COVID-19) may be circulating in the community. If you develop symptoms such as fever, cough, or SOB or if you have concerns about the presence of another infection including coronavirus (COVID-19), please contact your health care provider or visit [email or web address].     Disposition/Instructions    Patient to have an Urgent Care Telephone Visit with a provider. Follow System Ambulatory Workflow for COVID 19.     Urgent Care Telephone Visits are available between the hours of 8 am to 9 pm. Staff will assist patent in scheduling an appointment for this Urgent Care Telephone Visit.     Call Back If: Your symptoms worsen before you are able to complete your Urgent Care Telephone Visit with a provider.    Thank you for limiting contact with others, wearing a simple mask to cover your cough, practice good hand hygiene habits and accessing our virtual services where possible to limit the spread of this virus.    For more information about COVID19 and options for caring for yourself at home, please visit the CDC website at https://www.cdc.gov/coronavirus/2019-ncov/about/steps-when-sick.html  For more options for care at [facility], please visit our website at [email or web address]    For more information, please use the [facility] COVID-19 Website: https://www.health.state.[place]/diseases/coronavirus/index.html  [facility] ([facility]) COVID-19 Hotlines (Interpreters available):      Health questions: Phone Number: [phone] or  "1-629.832.1312 and Hours: 7 a.m. to 7 p.m.    Schools and  questions: Phone Number: [phone] or [phone] and Hours 7 a.m. to 7 p.m.                  Reason for Disposition    [1] MODERATE dizziness (e.g., vertigo; feels very unsteady, interferes with normal activities) AND [2] has NOT been evaluated by physician for this    Additional Information    Negative: Severe difficulty breathing (e.g., struggling for each breath, speaks in single words)    Negative: [1] Difficulty breathing or swallowing AND [2] started suddenly after medicine, an allergic food or bee sting    Negative: Shock suspected (e.g., cold/pale/clammy skin, too weak to stand, low BP, rapid pulse)    Negative: Difficult to awaken or acting confused (e.g., disoriented, slurred speech)    Negative: [1] Weakness (i.e., paralysis, loss of muscle strength) of the face, arm or leg on one side of the body AND [2] sudden onset AND [3] present now    Negative: [1] Numbness (i.e., loss of sensation) of the face, arm or leg on one side of the body AND [2] sudden onset AND [3] present now    Negative: [1] Loss of speech or garbled speech AND [2] sudden onset AND [3] present now    Negative: Overdose (accidental or intentional) of medications    Negative: [1] Fainted > 15 minutes ago AND [2] still feels too weak or dizzy to stand    Negative: Heart beating < 50 beats per minute OR > 140 beats per minute    Negative: Sounds like a life-threatening emergency to the triager    Negative: Chest pain    Negative: Rectal bleeding, bloody stool, or tarry-black stool    Negative: [1] Vomiting AND [2] contains red blood or black (\"coffee ground\") material    Negative: Vomiting is main symptom    Negative: Diarrhea is main symptom    Negative: Headache is main symptom    Negative: Patient states that he/she is having an anxiety/panic attack    Negative: Dizziness from low blood sugar (i.e., < 60 mg/dl or 3.5 mmol/l)    Dizziness is described as a spinning " sensation (i.e., vertigo)    Negative: [1] Weakness (i.e., paralysis, loss of muscle strength) of the face, arm or leg on one side of the body AND [2] sudden onset AND [3] present now    Negative: [1] Numbness (i.e., loss of sensation) of the face, arm or leg on one side of the body AND [2] sudden onset AND [3] present now    Negative: [1] Loss of speech or garbled speech AND [2] sudden onset AND [3] present now    Negative: Difficult to awaken or acting confused (e.g., disoriented, slurred speech)    Negative: Sounds like a life-threatening emergency to the triager    Negative: Followed a head injury    Negative: Followed an ear injury    Negative: Localized weakness or numbness is main symptom    Negative: Dizziness relates to riding in a car, going to an amusement park, etc.    Negative: [1] Dizziness is main symptom AND [2] NO spinning sensation (i.e., vertigo)    Negative: SEVERE dizziness (vertigo) (e.g., unable to walk without assistance)    Negative: [1] Dizziness (vertigo) present now AND [2] one or more stroke risk factors (i.e., hypertension, diabetes, prior stroke/TIA/heart attack)  (Exception: prior physician evaluation for this AND no different/worse than usual)    Negative: [1] Dizziness (vertigo) present now AND [2] age > 60  (Exception: prior physician evaluation for this AND no different/worse than usual)    Negative: Severe headache (e.g., excruciating)  (Exception: similar to previous migraines)    Negative: Patient sounds very sick or weak to the triager    Negative: Taking a medicine that could cause dizziness (e.g., phenytoin [Dilantin], carbamazepine [Tegretol], primidone [Mysoline])    Protocols used: DIZZINESS - VERTIGO-A-AH, DIZZINESS - [id number]

## 2020-04-29 ENCOUNTER — OFFICE VISIT (OUTPATIENT)
Dept: URGENT CARE | Facility: URGENT CARE | Age: 30
End: 2020-04-29
Payer: COMMERCIAL

## 2020-04-29 VITALS
HEIGHT: 63 IN | OXYGEN SATURATION: 99 % | BODY MASS INDEX: 43.41 KG/M2 | HEART RATE: 87 BPM | DIASTOLIC BLOOD PRESSURE: 73 MMHG | SYSTOLIC BLOOD PRESSURE: 112 MMHG | WEIGHT: 245 LBS | TEMPERATURE: 97.8 F

## 2020-04-29 DIAGNOSIS — R42 VERTIGO: Primary | ICD-10-CM

## 2020-04-29 PROCEDURE — 99214 OFFICE O/P EST MOD 30 MIN: CPT | Performed by: NURSE PRACTITIONER

## 2020-04-29 RX ORDER — MECLIZINE HYDROCHLORIDE 25 MG/1
25 TABLET ORAL 3 TIMES DAILY PRN
Qty: 30 TABLET | Refills: 0 | Status: SHIPPED | OUTPATIENT
Start: 2020-04-29 | End: 2020-06-30

## 2020-04-29 RX ORDER — PREDNISONE 20 MG/1
40 TABLET ORAL DAILY
Qty: 10 TABLET | Refills: 0 | Status: SHIPPED | OUTPATIENT
Start: 2020-04-29 | End: 2020-06-30

## 2020-04-29 RX ORDER — ONDANSETRON 4 MG/1
4 TABLET, ORALLY DISINTEGRATING ORAL EVERY 6 HOURS PRN
Qty: 18 TABLET | Refills: 1 | Status: SHIPPED | OUTPATIENT
Start: 2020-04-29 | End: 2020-06-30

## 2020-04-29 ASSESSMENT — ENCOUNTER SYMPTOMS
FEVER: 0
WEAKNESS: 0
LIGHT-HEADEDNESS: 1
NUMBNESS: 0
CONFUSION: 0
HEADACHES: 0
RHINORRHEA: 0
SORE THROAT: 0
ABDOMINAL PAIN: 0
SPEECH DIFFICULTY: 0
VOMITING: 1
SHORTNESS OF BREATH: 0
MYALGIAS: 0
COUGH: 0
NECK PAIN: 0
NAUSEA: 1
DIARRHEA: 0

## 2020-04-29 ASSESSMENT — MIFFLIN-ST. JEOR: SCORE: 1800.44

## 2020-04-29 NOTE — PATIENT INSTRUCTIONS
Meclizine three times a day as needed for dizziness  Ondansetron every 6 hours as needed for nausea/vomiting  Prednisone daily for 5 days      Patient Education     Dizziness (Vertigo) and Balance Problems: Staying Safe     Replace burned-out light bulbs to keep your home safe and well lit.     Falls or accidents can lead to pain, broken bones, and fear of future falls. Protect yourself and others by preparing for episodes. Simple steps can help you stay safe at home and wherever you go.  Lighting  Keep all areas well lit. This helps your eyes send the right signals to the brain. It also makes you less likely to trip and fall. If bright lights make symptoms worse, dim the lights or lie in a dark room until the dizziness passes. Then turn the lights back to their normal level.  Tips:    Keep a flashlight by the bed.    Place nightlights in bathrooms and hallways.    Replace burned-out bulbs, or have someone replace them for you.  Preventing falls  To reduce your risk of falling:    Get out of bed or up from a chair slowly.    Wear low-heeled shoes that fit properly and have slip-resistant soles.    Remove throw rugs. Clear clutter from walkways.    Use handrails on stairs. Have handrails installed or adjusted if needed.    Install grab bars in the bathroom. Don't use towel racks for balance.    Use a shower stool. Also put adhesive strips in the shower or on the tub floor.  Going out  With a little time and preparation, you can get around safely.  Tips:    Bring a cane or walking aid if needed.    Give yourself plenty of time in case you start to get dizzy.    Ask your healthcare provider what type of exercise is safe for your condition.    Be patient. If an activity such as walking through a crowded shop causes you stress, you may not be ready for it yet.  Driving  If you become dizzy or disoriented while driving, you could hurt yourself and others. That's why it's best to not drive until symptoms have gone away. In  some cases, your license may be temporarily held until it's safe for you to drive again.  For safety:    Ask a friend to drive for you.    Take public transportation.    Walk to stores and other places when you can.  Asking for help  Don't be afraid to ask for help running errands, cooking meals, and doing exercise. Whether it's a friend, loved one, neighbor, or stranger on the street, a little help can make a world of difference.   Date Last Reviewed: 11/1/2016 2000-2019 [employer]. [address] [place] [address]. All rights reserved. This information is not intended as a substitute for professional medical care. Always follow your healthcare professional's instructions.           Patient Education     Labyrinthitis    The inner ear is located behind the middle ear. It is part of the balance center of your body. When the inner ear becomes irritated or inflamed it causes a condition known as labyrinthitis. It may due to a viral infection, but often a cause is not found. Labyrinthitis causes sudden dizziness and balance problems. It often causes a feeling that you or the room is spinning (vertigo). You may feel nauseated or throw up. You may also feel a loss of balance when trying to walk. Head movement from side to side or changes in body position (from lying to sitting or standing) may worsen symptoms. You may have ringing in the ear. Hearing may also be affected.  An episode of labyrinthitis may last seconds, minutes, or hours. It may never return. Or symptoms may recur off and on over several weeks or longer. In many cases, the problem is short-term and goes away when the inner ear issue resolves.  Home care    Take medicine as prescribed to relieve your symptoms. Unless another medicine was prescribed, you can try over-the-counter motion sickness pills. Note that these medicines may cause drowsiness.    If symptoms are severe, rest quietly in bed. Change positions slowly. There may  be 1 position feels best, such as lying on 1 side or lying on your back with your head slightly raised on pillows. Until you have no symptoms, you are at a higher risk of falling. Let someone help you when you get up. Get rid of home hazards such as loose electrical cords and throw rugs. Don t walk in unfamiliar areas that are not lighted. Use night lights in bathrooms and kitchen areas.    Vestibular rehabilitation exercises are done by moving your head to help fix problems in the inner ear. If these exercises have been prescribed, do them as you have been instructed.    Do not drive or work with dangerous machinery until 1 week has passed without symptoms. Be careful when using stairs.    Follow-up care  Follow up with your healthcare provider or as advised by our staff.  When to seek medical advice  Call your healthcare provider for any of the following:    Symptoms that are not controlled by medicine     Symptoms that get worse    Repeated vomiting that is not relieved by medicine    Weakness that gets worse    Fainting    Headache or unusual drowsiness    Trouble with vision or speech    Trouble moving your face, arms, or legs    Hearing loss    Symptoms that last more than a few days  Date Last Reviewed: 11/1/2017 2000-2019 [employer]. [address] [place] [address], [place] [address]. All rights reserved. This information is not intended as a substitute for professional medical care. Always follow your healthcare professional's instructions.

## 2020-04-29 NOTE — LETTER
[facility]  [address] [place] [address]  [phone]      April 29, 2020    RE:  Rosanna Leung                                                                                                                                                       [address] [place] [address]            To whom it may concern:    Rosanna Leung is under my professional care for an acute problem She may return to work on 4/30/2020.        Sincerely,        Jimmy Waldron CNP    [facility]

## 2020-06-30 ENCOUNTER — VIRTUAL VISIT (OUTPATIENT)
Dept: OBGYN | Facility: CLINIC | Age: 30
End: 2020-06-30
Payer: COMMERCIAL

## 2020-06-30 DIAGNOSIS — Z11.3 ROUTINE SCREENING FOR STI (SEXUALLY TRANSMITTED INFECTION): Primary | ICD-10-CM

## 2020-06-30 DIAGNOSIS — N93.8 DUB (DYSFUNCTIONAL UTERINE BLEEDING): ICD-10-CM

## 2020-06-30 PROCEDURE — 99213 OFFICE O/P EST LOW 20 MIN: CPT | Mod: 95 | Performed by: OBSTETRICS & GYNECOLOGY

## 2020-06-30 RX ORDER — LEVONORGESTREL AND ETHINYL ESTRADIOL 0.15-0.03
1 KIT ORAL DAILY
Qty: 84 TABLET | Refills: 4 | Status: SHIPPED | OUTPATIENT
Start: 2020-06-30 | End: 2024-01-04

## 2020-06-30 NOTE — PROGRESS NOTES
"Rosanna Leung is a 29 year old female who is being evaluated via a billable telephone visit.      The patient has been notified of following:     \"This telephone visit will be conducted via a call between you and your physician/provider. We have found that certain health care needs can be provided without the need for a physical exam.  This service lets us provide the care you need with a short phone conversation.  If a prescription is necessary we can send it directly to your pharmacy.  If lab work is needed we can place an order for that and you can then stop by our lab to have the test done at a later time.    Telephone visits are billed at different rates depending on your insurance coverage. During this emergency period, for some insurers they may be billed the same as an in-person visit.  Please reach out to your insurance provider with any questions.    If during the course of the call the physician/provider feels a telephone visit is not appropriate, you will not be charged for this service.\"    Patient has given verbal consent for Telephone visit?  Yes    What phone number would you like to be contacted at?   [phone]  How would you like to obtain your AVS? Handy    Seen last year 7/2019 and put on OCP and missed one month but otherwise last 3 months has menses on regular schedule on pill.    Problem is taking at different times and trying to remember every day.  Usually takes it within a 3-hour time window but concerned that this may lead to pregnancy.  Has read that the IUD is more effective.  Wants to discuss IUD options.  Not planning children for a few years.    (Z11.3) Routine screening for STI (sexually transmitted infection)  (primary encounter diagnosis)  Comment: pt request  Plan: Hepatitis B surface antigen, HIV Antigen         Antibody Combo, NEISSERIA GONORRHOEA PCR,         CHLAMYDIA TRACHOMATIS PCR, Treponema Abs w         Reflex to [facility] and Titer, Hepatitis C antibody        Can make " lab only appointment to check labs.    (N93.8) DUB (dysfunctional uterine bleeding)  Comment: better on OCP  Plan: levonorgestrel-ethinyl estradiol (NORDETTE)         0.15-30 MG-MCG tablet        Discussed all contraceptive options.  Reviewed advantages of IUD versus OCP.  She thought the birth control pill led to increased risk of cervical cancer and this was discussed.  Discussed procedure of placing IUD in the different types.  At this point, she would prefer to continue taking her birth control pill on a scheduled basis and will make IUD appointment if desires.  All questions were answered to patient's satisfaction.        Phone call duration: 20 minutes    MAIDA MEI MD

## 2020-08-21 ENCOUNTER — E-VISIT (OUTPATIENT)
Dept: OBGYN | Facility: CLINIC | Age: 30
End: 2020-08-21
Payer: COMMERCIAL

## 2020-08-21 DIAGNOSIS — Z53.9 ERRONEOUS ENCOUNTER--DISREGARD: Primary | ICD-10-CM

## 2020-08-22 NOTE — TELEPHONE ENCOUNTER
Has not responded in >2 weeks. Will cancel visit.    Rosalba Pina MD    This encounter was opened in error. Please disregard.

## 2020-11-22 ENCOUNTER — HEALTH MAINTENANCE LETTER (OUTPATIENT)
Age: 30
End: 2020-11-22

## 2021-01-11 ENCOUNTER — E-VISIT (OUTPATIENT)
Dept: OBGYN | Facility: CLINIC | Age: 31
End: 2021-01-11
Payer: COMMERCIAL

## 2021-01-11 ENCOUNTER — APPOINTMENT (OUTPATIENT)
Dept: URGENT CARE | Facility: CLINIC | Age: 31
End: 2021-01-11
Payer: COMMERCIAL

## 2021-01-11 DIAGNOSIS — N92.6 IRREGULAR MENSES: Primary | ICD-10-CM

## 2021-01-11 PROCEDURE — 99421 OL DIG E/M SVC 5-10 MIN: CPT | Performed by: OBSTETRICS & GYNECOLOGY

## 2021-01-15 ENCOUNTER — HEALTH MAINTENANCE LETTER (OUTPATIENT)
Age: 31
End: 2021-01-15

## 2021-01-26 ENCOUNTER — E-VISIT (OUTPATIENT)
Dept: FAMILY MEDICINE | Facility: CLINIC | Age: 31
End: 2021-01-26
Payer: COMMERCIAL

## 2021-01-26 DIAGNOSIS — Z20.822 PERSON UNDER INVESTIGATION FOR COVID-19: Primary | ICD-10-CM

## 2021-01-26 PROCEDURE — 99421 OL DIG E/M SVC 5-10 MIN: CPT | Performed by: NURSE PRACTITIONER

## 2021-01-27 ENCOUNTER — AMBULATORY - HEALTHEAST (OUTPATIENT)
Dept: FAMILY MEDICINE | Facility: CLINIC | Age: 31
End: 2021-01-27

## 2021-01-27 DIAGNOSIS — Z20.822 PERSON UNDER INVESTIGATION FOR COVID-19: ICD-10-CM

## 2021-03-04 ENCOUNTER — E-VISIT (OUTPATIENT)
Dept: FAMILY MEDICINE | Facility: CLINIC | Age: 31
End: 2021-03-04
Payer: COMMERCIAL

## 2021-03-04 DIAGNOSIS — J45.21 MILD INTERMITTENT ASTHMA WITH EXACERBATION: ICD-10-CM

## 2021-03-04 PROCEDURE — 99422 OL DIG E/M SVC 11-20 MIN: CPT | Performed by: NURSE PRACTITIONER

## 2021-03-04 RX ORDER — ALBUTEROL SULFATE 90 UG/1
1-2 AEROSOL, METERED RESPIRATORY (INHALATION) EVERY 4 HOURS PRN
Qty: 1 INHALER | Refills: 1 | Status: SHIPPED | OUTPATIENT
Start: 2021-03-04 | End: 2021-12-13

## 2021-03-04 RX ORDER — FLUTICASONE PROPIONATE 220 UG/1
1 AEROSOL, METERED RESPIRATORY (INHALATION) 2 TIMES DAILY
Qty: 12 G | Refills: 1 | Status: SHIPPED | OUTPATIENT
Start: 2021-03-04 | End: 2021-04-21

## 2021-03-19 ENCOUNTER — IMMUNIZATION (OUTPATIENT)
Dept: NURSING | Facility: CLINIC | Age: 31
End: 2021-03-19
Payer: COMMERCIAL

## 2021-03-19 PROCEDURE — 91300 PR COVID VAC PFIZER DIL RECON 30 MCG/0.3 ML IM: CPT

## 2021-03-19 PROCEDURE — 0001A PR COVID VAC PFIZER DIL RECON 30 MCG/0.3 ML IM: CPT

## 2021-04-09 ENCOUNTER — IMMUNIZATION (OUTPATIENT)
Dept: NURSING | Facility: CLINIC | Age: 31
End: 2021-04-09
Attending: INTERNAL MEDICINE
Payer: COMMERCIAL

## 2021-04-09 PROCEDURE — 0002A PR COVID VAC PFIZER DIL RECON 30 MCG/0.3 ML IM: CPT

## 2021-04-09 PROCEDURE — 91300 PR COVID VAC PFIZER DIL RECON 30 MCG/0.3 ML IM: CPT

## 2021-04-21 ENCOUNTER — VIRTUAL VISIT (OUTPATIENT)
Dept: FAMILY MEDICINE | Facility: CLINIC | Age: 31
End: 2021-04-21
Payer: COMMERCIAL

## 2021-04-21 DIAGNOSIS — J45.21 MILD INTERMITTENT ASTHMA WITH EXACERBATION: Primary | ICD-10-CM

## 2021-04-21 PROCEDURE — 99214 OFFICE O/P EST MOD 30 MIN: CPT | Mod: 95 | Performed by: NURSE PRACTITIONER

## 2021-04-21 RX ORDER — FLUTICASONE PROPIONATE 220 UG/1
1 AEROSOL, METERED RESPIRATORY (INHALATION) 2 TIMES DAILY
Qty: 12 G | Refills: 1 | Status: SHIPPED | OUTPATIENT
Start: 2021-04-21 | End: 2021-12-13

## 2021-04-21 NOTE — PROGRESS NOTES
"Rosanna is a 30 year old who is being evaluated via a billable telephone visit.      What phone number would you like to be contacted at? [phone]  How would you like to obtain your AVS? Handy    Assessment & Plan     (J45.21) Mild intermittent asthma with exacerbation  (primary encounter diagnosis)  Comment: not controlled  Plan: PULMONARY MEDICINE REFERRAL, fluticasone         (FLOVENT HFA) 220 MCG/ACT inhaler        I talked with Rosanna about a follow up appointment with pulmonology ASAP.  She is to use her flovent inhaler twice a day, regardless of how she is feeling.  She can use her albuterol inhaler as prescribed/needed for wheezing/SOB.    I did agree to write a letter of accommodation for her for the next month, okay to work from home.  She is to get extra accommodation from the specialist otherwise I will anticipate she is going in to her office to work.    She is going to have the accommodation form sent here to my attention.           BMI:   Estimated body mass index is 43.4 kg/m  as calculated from the following:    Height as of 4/29/20: 1.6 m (5' 3\").    Weight as of 4/29/20: 111.1 kg (245 lb).   Weight management plan: not addressed during this virtual visit     See Patient Instructions    No follow-ups on file.    TEGAN La [facility]    Subjective   Rosanna is a 30 year old who presents for the following health issues    HPI     Pt is concerned abut covid symptoms as well.     Asthma Follow-Up    Was ACT completed today?    Yes    ACT Total Scores 4/21/2021   ACT TOTAL SCORE (Goal Greater than or Equal to 20) 16   In the past 12 months, how many times did you visit the emergency room for your asthma without being admitted to the hospital? 0   In the past 12 months, how many times were you hospitalized overnight because of your asthma? 0       How many days per week do you miss taking your asthma controller medication?  0    Please describe any " "recent triggers for your asthma: dust mites, mold and cold air    Have you had any Emergency Room Visits, Urgent Care Visits, or Hospital Admissions since your last office visit?  No      How many servings of fruits and vegetables do you eat daily?  2-3    On average, how many sweetened beverages do you drink each day (Examples: soda, juice, sweet tea, etc.  Do NOT count diet or artificially sweetened beverages)?   0    How many days per week do you exercise enough to make your heart beat faster? 3 or less    How many minutes a day do you exercise enough to make your heart beat faster? 30 - 60    How many days per week do you miss taking your medication? 0    Rosanna has a history of asthma.  She recently has been having some SOB.  Cold air is a trigger.  She uses her flovent inhaler twice a day \"but sometimes I don't need it.\"    Recently, she is having more respiratory symptoms.  She is using her albuterol inhaler once a day for wheezing and SOB.    Today, no fever today.  She has felt cold today and she turned on her furnace.  She is eating and drinking normally.    No history of allergies.    Today:  43 degrees.    Request today:  She has an accommodation to work from home.  Work: she works for the [employer]. She is required to go into the clinic 2 days a week.   .  She is requesting to work from home until she can see a specialist for asthma management.      She did get the covid vaccine.      Review of Systems   Constitutional, HEENT, cardiovascular, pulmonary, GI, , musculoskeletal, neuro, skin, endocrine and psych systems are negative, except as otherwise noted.      Objective         Vitals:  No vitals were obtained today due to virtual visit.    Physical Exam   healthy, alert and no distress  PSYCH: Alert and oriented times 3; coherent speech, normal   rate and volume, able to articulate logical thoughts, able   to abstract reason, no tangential thoughts, no hallucinations   or delusions  Her " affect is normal  RESP: No cough, no audible wheezing, able to talk in full sentences  Remainder of exam unable to be completed due to telephone visits    Diagnostics:  Reviewed in epic          Phone call duration: 12 minutes

## 2021-04-21 NOTE — LETTER
April 21, 2021      Rosanna Leung  [address]   [place] [address]        To Whom It May Concern,     Due to a medical condition, Rosanna is to be excused from work today.    Sincerely,        TEGAN La CNP

## 2021-04-22 DIAGNOSIS — J45.909 ASTHMA: Primary | ICD-10-CM

## 2021-04-22 ASSESSMENT — ASTHMA QUESTIONNAIRES: ACT_TOTALSCORE: 16

## 2021-04-23 NOTE — TELEPHONE ENCOUNTER
RECORDS RECEIVED FROM: internal    DATE RECEIVED: 5.18.21    NOTES STATUS DETAILS   OFFICE NOTE from referring provider internal  Pat Manuel,   OFFICE NOTE from other specialist na    DISCHARGE SUMMARY from hospital na    DISCHARGE REPORT from the ER na    MEDICATION LIST internal     IMAGING  (NEED IMAGES AND REPORTS)     CT SCAN In process     CHEST XRAY (CXR) In process     TESTS     PULMONARY FUNCTION TESTING (PFT) internal  Scheduled 5.18.21        Action 4.23.21 sv   Action Taken Message sent to  [email or web address] to call pt and help scheduled CXR

## 2021-05-18 ENCOUNTER — PRE VISIT (OUTPATIENT)
Dept: PULMONOLOGY | Facility: CLINIC | Age: 31
End: 2021-05-18

## 2021-09-19 ENCOUNTER — HEALTH MAINTENANCE LETTER (OUTPATIENT)
Age: 31
End: 2021-09-19

## 2021-10-01 ENCOUNTER — E-VISIT (OUTPATIENT)
Dept: FAMILY MEDICINE | Facility: CLINIC | Age: 31
End: 2021-10-01
Payer: COMMERCIAL

## 2021-10-01 DIAGNOSIS — J45.20 MILD INTERMITTENT ASTHMA WITHOUT COMPLICATION: ICD-10-CM

## 2021-10-01 PROCEDURE — 99207 PR NON-BILLABLE SERV PER CHARTING: CPT | Performed by: FAMILY MEDICINE

## 2021-10-02 NOTE — PATIENT INSTRUCTIONS
"  Thank you for choosing us for your care. I think an in-clinic visit would be best next steps based on your symptoms. Please schedule a clinic appointment; you won t be charged for this eVisit.      You can schedule an appointment right here in [facility], or call [phone]      Self-Care for Headaches  Most headaches aren't serious and can be relieved with self-care. But some headaches may be a sign of another health problem like eye trouble or high blood pressure. To find the best treatment, learn what kind of headaches you get. For tension headaches, self-care will usually help. To treat migraines, ask your healthcare provider for advice. It's also possible to get both tension and migraine headaches. Self-care involves relieving the pain and avoiding headache \"triggers\" if you can.     Ways to reduce pain and tension  Try these steps:    Apply a cold compress or ice pack to the pain site.    Drink fluids. If nausea makes it hard to drink, try sucking on ice.    Rest. Protect yourself from bright light and loud noises.    Calm your emotions by imagining a peaceful scene.    Massage tight neck, shoulder, and head muscles.    To relax muscles, soak in a hot bath or use a hot shower.  Use medicines  Aspirin or other over-the-counter (OTC) pain medicines such as ibuprofen and acetaminophen can relieve headache. Remember: Never give aspirin to anyone 18 years old or younger because of the risk of getting Reye syndrome. Use pain medicines only when needed. Certain prescription and OTC medicines can lead to rebound headaches if they are taken too often. Check with your healthcare provider or pharmacist about your medicines.   Track your headaches  Keeping a headache diary can help you and your healthcare provider identify what's causing your headaches:     Note when each headache happens.    Identify your activities and the foods you've eaten 6 to 8 hours before the headache began.    Look for any trends or " "\"triggers.\"    Bring the information to your next medical appointment.  Signs of tension headache  Any of the following can be signs:     Dull pain or feeling of pressure in a tight band around your head    Pain in your neck or shoulders    Headache without a definite beginning or end    Headache after an activity such as driving or working on a computer  Signs of migraine  Any of the following can be signs:     Throbbing pain on one or both sides of your head    Nausea or vomiting    Extreme sensitivity to light, sound, and smells    Bright spots, flashes, or other visual changes    Pain or nausea so severe that you can't continue your daily activities  When to call your healthcare provider   Call your healthcare provider if any of these occur:     A headache that lingers after a recent injury or bump to the head    A headache that lasts for more than 3 days    Frequent headaches, especially in the morning  Get medical care right away if you have:    A headache along with slurred speech, changes in your vision, or numbness or weakness in your arms or legs    Headaches with seizures    A fever with a stiff neck or pain when you bend your head toward your chest    A headache that you would call \"the worst headache you have ever had\" or a severe, persistent headache that gets worse or doesn't get better with treatment  Exinda last reviewed this educational content on 7/1/2020 2000-2021 [employer]. All rights reserved. This information is not intended as a substitute for professional medical care. Always follow your healthcare professional's instructions.        "

## 2021-11-21 ENCOUNTER — TELEPHONE (OUTPATIENT)
Dept: FAMILY MEDICINE | Facility: CLINIC | Age: 31
End: 2021-11-21
Payer: COMMERCIAL

## 2021-11-21 NOTE — TELEPHONE ENCOUNTER
Im not her PCP , never seen before  Excess bleeding cna be due t many reason  Needs eval  With her BMI and hx of e viist for headaches in ct when was advised an apt bcp need to be chosen with caution as some can worsen and put her at risk of other issues.  Risks include  risk for heart attack, stroke and blood clots. Regular condom use is recommended to help protect against STIs.  Recommend she make an apt with one of the obs at [place] or [place].

## 2021-11-21 NOTE — TELEPHONE ENCOUNTER
Reason for Call:  Same Day Appointment, Requested Provider:  Kedar Barrera    PCP: Kedar Barrera     Reason for visit: PT wants to get back on birth control says she is having excessive bleeding, called to see if prescription [place] could be called in to [facility] pharmacy at [address] For her, or does she have to come in?  If she has to come in can she be worked in sooner than end of Dec? Pt used to see Segundo and now needs new provider.    Duration of symptoms: N/A    Have you been treated for this in the past? Yes    Additional comments: Please call pt to advise or work in at [phone]    Can we leave a detailed message on this number? YES    Phone number patient can be reached at: Home number on file [phone] (home)    Best Time: ANY    Call taken on 11/21/2021 at 1:38 PM by Huma Rodriguez

## 2021-11-22 NOTE — TELEPHONE ENCOUNTER
I called and spoke to Rosanna and she said her periods have been on and off for a few months, periods are heavy when she gets then and she gets migraines when she does not get them. appt made for 11/29/2021 with provider, if bleeding gets worse before visit she will call.    Melany Webster RN

## 2021-12-12 ENCOUNTER — MYC MEDICAL ADVICE (OUTPATIENT)
Dept: FAMILY MEDICINE | Facility: CLINIC | Age: 31
End: 2021-12-12
Payer: COMMERCIAL

## 2021-12-13 ENCOUNTER — OFFICE VISIT (OUTPATIENT)
Dept: PEDIATRICS | Facility: CLINIC | Age: 31
End: 2021-12-13
Payer: COMMERCIAL

## 2021-12-13 VITALS
SYSTOLIC BLOOD PRESSURE: 109 MMHG | TEMPERATURE: 98.7 F | RESPIRATION RATE: 16 BRPM | HEART RATE: 86 BPM | OXYGEN SATURATION: 99 % | DIASTOLIC BLOOD PRESSURE: 74 MMHG | BODY MASS INDEX: 47.74 KG/M2 | WEIGHT: 269.5 LBS

## 2021-12-13 DIAGNOSIS — E66.01 MORBID OBESITY (H): ICD-10-CM

## 2021-12-13 DIAGNOSIS — Z23 NEED FOR PROPHYLACTIC VACCINATION AND INOCULATION AGAINST INFLUENZA: Primary | ICD-10-CM

## 2021-12-13 DIAGNOSIS — G43.009 MIGRAINE WITHOUT AURA AND WITHOUT STATUS MIGRAINOSUS, NOT INTRACTABLE: ICD-10-CM

## 2021-12-13 DIAGNOSIS — Z11.59 NEED FOR HEPATITIS C SCREENING TEST: ICD-10-CM

## 2021-12-13 DIAGNOSIS — Z13.220 SCREENING FOR HYPERLIPIDEMIA: ICD-10-CM

## 2021-12-13 DIAGNOSIS — J45.21 MILD INTERMITTENT ASTHMA WITH EXACERBATION: ICD-10-CM

## 2021-12-13 DIAGNOSIS — Z11.4 SCREENING FOR HIV (HUMAN IMMUNODEFICIENCY VIRUS): ICD-10-CM

## 2021-12-13 PROCEDURE — 90686 IIV4 VACC NO PRSV 0.5 ML IM: CPT | Performed by: INTERNAL MEDICINE

## 2021-12-13 PROCEDURE — 99214 OFFICE O/P EST MOD 30 MIN: CPT | Mod: 25 | Performed by: INTERNAL MEDICINE

## 2021-12-13 PROCEDURE — 90471 IMMUNIZATION ADMIN: CPT | Performed by: INTERNAL MEDICINE

## 2021-12-13 PROCEDURE — 91300 COVID-19,PF,PFIZER (12+ YRS): CPT | Performed by: INTERNAL MEDICINE

## 2021-12-13 PROCEDURE — 0004A COVID-19,PF,PFIZER (12+ YRS): CPT | Performed by: INTERNAL MEDICINE

## 2021-12-13 RX ORDER — FLUTICASONE PROPIONATE 220 UG/1
1 AEROSOL, METERED RESPIRATORY (INHALATION) 2 TIMES DAILY
Qty: 12 G | Refills: 11 | Status: SHIPPED | OUTPATIENT
Start: 2021-12-13 | End: 2022-12-13

## 2021-12-13 RX ORDER — ALBUTEROL SULFATE 90 UG/1
1-2 AEROSOL, METERED RESPIRATORY (INHALATION) EVERY 4 HOURS PRN
Qty: 18 G | Refills: 2 | Status: SHIPPED | OUTPATIENT
Start: 2021-12-13 | End: 2022-12-13

## 2021-12-13 ASSESSMENT — ANXIETY QUESTIONNAIRES
1. FEELING NERVOUS, ANXIOUS, OR ON EDGE: NOT AT ALL
2. NOT BEING ABLE TO STOP OR CONTROL WORRYING: NOT AT ALL
5. BEING SO RESTLESS THAT IT IS HARD TO SIT STILL: SEVERAL DAYS
GAD7 TOTAL SCORE: 3
6. BECOMING EASILY ANNOYED OR IRRITABLE: SEVERAL DAYS
IF YOU CHECKED OFF ANY PROBLEMS ON THIS QUESTIONNAIRE, HOW DIFFICULT HAVE THESE PROBLEMS MADE IT FOR YOU TO DO YOUR WORK, TAKE CARE OF THINGS AT HOME, OR GET ALONG WITH OTHER PEOPLE: SOMEWHAT DIFFICULT
7. FEELING AFRAID AS IF SOMETHING AWFUL MIGHT HAPPEN: NOT AT ALL
3. WORRYING TOO MUCH ABOUT DIFFERENT THINGS: NOT AT ALL

## 2021-12-13 ASSESSMENT — PATIENT HEALTH QUESTIONNAIRE - PHQ9
5. POOR APPETITE OR OVEREATING: SEVERAL DAYS
SUM OF ALL RESPONSES TO PHQ QUESTIONS 1-9: 10

## 2021-12-13 NOTE — PROGRESS NOTES
Assessment & Plan     Screening for HIV (human immunodeficiency virus)      Need for hepatitis C screening test      Screening for hyperlipidemia      Mild intermittent asthma with exacerbation  Has been taking flovent as needed instead of preventively.  Change to twice daily.  Follow-up in 6 months.    - albuterol (PROAIR HFA) 108 (90 Base) MCG/ACT inhaler; Inhale 1-2 puffs into the lungs every 4 hours as needed for shortness of breath / dyspnea or wheezing  - fluticasone (FLOVENT HFA) 220 MCG/ACT inhaler; Inhale 1 puff into the lungs 2 times daily    Migraine without aura and without status migrainosus, not intractable  Using ibuprofen with relief.      Morbid obesity (H)  Needs further discussion                Depression Screening Follow Up    PHQ 12/13/2021   PHQ-9 Total Score 10   Q9: Thoughts of better off dead/self-harm past 2 weeks Not at all     Last PHQ-9 12/13/2021   1.  Little interest or pleasure in doing things 1   2.  Feeling down, depressed, or hopeless 0   3.  Trouble falling or staying asleep, or sleeping too much 2   4.  Feeling tired or having little energy 2   5.  Poor appetite or overeating 2   6.  Feeling bad about yourself 0   7.  Trouble concentrating 2   8.  Moving slowly or restless 1   Q9: Thoughts of better off dead/self-harm past 2 weeks 0   PHQ-9 Total Score 10   Difficulty at work, home, or with people Somewhat difficult         Patient Instructions   Flu shot and covid booster.    Change to taking the flovent twice daily every day.    May contineu as needed albuterol.    Paper work today indicating up to 2 days / month off for migraines or asthma.    Osvaldo Nieves MD  Internal Medicine and Pediatrics         Return in about 6 months (around 6/13/2022) for physical, with me, in person.    Osvaldo Nieves MD  [facility] RUY Marte is a 31 year old who presents for the following health issues     History of Present Illness     Asthma:  She presents for  "follow up of asthma.  She has no cough, no wheezing, and some shortness of breath. She is using a relief medication a few times a month. She typically misses taking her controller medication 3 time(s) per week.Patient is aware of the following triggers: unaware of any triggers, cold air and smoke. The patient has not had a visit to the Emergency Room, Urgent Care or Hospital due to asthma since the last clinic visit.     Migraines:   Since the patient's last clinic visit, headaches are: no change  The patient is getting headaches:  I would say about twice a week it's been getting better lately but the problem was arising a lot for the past two and a half months  She is not able to do normal daily activities when she has a migraine.  The patient is taking the following rescue/relief medications:  Ibuprofen (Advil, Motrin)   Patient states \"I get some relief\" from the rescue/relief medications.   The patient is taking the following medications to prevent migraines:  No medications to prevent migraines  In the past 4 weeks, the patient has gone to an Urgent Care or Emergency Room 0 times times due to headaches.    She eats 2-3 servings of fruits and vegetables daily.She consumes 1 sweetened beverage(s) daily.She exercises with enough effort to increase her heart rate 30 to 60 minutes per day.  She exercises with enough effort to increase her heart rate 3 or less days per week. She is missing 2 dose(s) of medications per week.       Had head injury in ; since then has had posterior occipital HAs.  Has gotten them on and off since then. Now has had them more frequently;  Dull, right now a 1/10.  Gets them once every 2 weeks, but had been 2 times per week; last all day.  Prevents her from looking at screens.  Ibuprofen helps.     \Asthma: worse when it gets cold; harder for her to talk.  SOmetimes requires her to take albuterol up to twice daily.  Takes flovent when is having worsening flares.      Pain History:  When " did you first notice your pain? - More than 6 weeks, had similiar HA's back in HS, also notes head injury in HS  Have you seen this provider for your pain in the past? No   Where in your body do your have pain? Right side of head towards the back, worsened while looking at computer monitor or TV  Are you seeing anyone else for your pain? No  What makes your pain better? Laying down, getting away from screens  What makes your pain worse? Screens or laying on right side, anything puts pressure on head  How has pain affected your ability to work? Pain does not limit ability to work   What type of work do you or did you do? Works from home  Who lives in your household? sister  [id number]}  [id number]}  PHQ-9 SCORE 12/13/2021   PHQ-9 Total Score 10     MELIZA-7 SCORE 12/13/2021   Total Score 3     PEG Score 12/13/2021   PEG Total Score 1.67     Chronic Pain - Initial Assessment:    How would you describe your pain? Stabbing, sharp pain  Have you had any recent changes to the severity or character of your pain? Seems better not worse, better with exercise  Is there an underlying cause that has been identified? Hx of head trauma in HS possibly  Has your ability to work or do daily activities changed recently because of your pain? Yes  Which of these pain treatments have you tried? Other: Ibuprofen  Previous medication treatments:  NSAIDs - ibuprofen (Motrin)            Review of Systems   CONSTITUTIONAL: NEGATIVE for fever, chills, change in weight  INTEGUMENTARY/SKIN: NEGATIVE for worrisome rashes, moles or lesions  EYES: NEGATIVE for vision changes or irritation  ENT/MOUTH: NEGATIVE for ear, mouth and throat problems  RESP: NEGATIVE for significant cough or SOB  BREAST: NEGATIVE for masses, tenderness or discharge  CV: NEGATIVE for chest pain, palpitations or peripheral edema  GI: NEGATIVE for nausea, abdominal pain, heartburn, or change in bowel habits  : NEGATIVE for frequency, dysuria, or hematuria  MUSCULOSKELETAL: NEGATIVE  for significant arthralgias or myalgia  NEURO: NEGATIVE for weakness, dizziness or paresthesias  ENDOCRINE: NEGATIVE for temperature intolerance, skin/hair changes  HEME: NEGATIVE for bleeding problems  PSYCHIATRIC: NEGATIVE for changes in mood or affect      Objective    /74   Pulse 86   Temp 98.7  F (37.1  C) (Oral)   Resp 16   Wt 122.2 kg (269 lb 8 oz)   SpO2 99%   BMI 47.74 kg/m    Body mass index is 47.74 kg/m .  Physical Exam   GENERAL: healthy, alert and no distress  EYES: Eyes grossly normal to inspection, PERRL and conjunctivae and sclerae normal  HENT: ear canals and TM's normal, nose and mouth without ulcers or lesions  NECK: no adenopathy, no asymmetry, masses, or scars and thyroid normal to palpation  RESP: lungs clear to auscultation - no rales, rhonchi or wheezes  BREAST: normal without masses, tenderness or nipple discharge and no palpable axillary masses or adenopathy  CV: regular rate and rhythm, normal S1 S2, no S3 or S4, no murmur, click or rub, no peripheral edema and peripheral pulses strong  ABDOMEN: soft, nontender, no hepatosplenomegaly, no masses and bowel sounds normal  MS: no gross musculoskeletal defects noted, no edema  SKIN: no suspicious lesions or rashes  NEURO: Normal strength and tone, mentation intact and speech normal  PSYCH: mentation appears normal, affect normal/bright

## 2021-12-13 NOTE — PATIENT INSTRUCTIONS
Flu shot and covid booster.    Change to taking the flovent twice daily every day.    May contineu as needed albuterol.    Paper work today indicating up to 2 days / month off for migraines or asthma.    Osvaldo Nieves MD  Internal Medicine and Pediatrics

## 2021-12-14 ASSESSMENT — ASTHMA QUESTIONNAIRES: ACT_TOTALSCORE: 14

## 2021-12-14 ASSESSMENT — ANXIETY QUESTIONNAIRES: GAD7 TOTAL SCORE: 3

## 2022-03-02 ENCOUNTER — TELEPHONE (OUTPATIENT)
Dept: PEDIATRICS | Facility: CLINIC | Age: 32
End: 2022-03-02
Payer: COMMERCIAL

## 2022-03-03 NOTE — TELEPHONE ENCOUNTER
Patient Quality Outreach    Patient is due for the following:   Physical  - Due after 6/17/2020    NEXT STEPS:   Schedule a yearly physical  Asthma - ACT needed    Type of outreach:    Sent "[employer]" message.    Next Steps:  Reach out within 90 days via "[employer]".    Max number of attempts reached: No. Will try again in 90 days if patient still on fail list.    Questions for provider review:    None     Danyel Evans  Chart routed to Self.

## 2022-03-06 ENCOUNTER — HEALTH MAINTENANCE LETTER (OUTPATIENT)
Age: 32
End: 2022-03-06

## 2022-03-09 ENCOUNTER — APPOINTMENT (OUTPATIENT)
Dept: URGENT CARE | Facility: CLINIC | Age: 32
End: 2022-03-09
Payer: COMMERCIAL

## 2022-03-30 ENCOUNTER — E-VISIT (OUTPATIENT)
Dept: OBGYN | Facility: CLINIC | Age: 32
End: 2022-03-30
Payer: COMMERCIAL

## 2022-03-30 DIAGNOSIS — Z30.011 ORAL CONTRACEPTIVE PRESCRIBED: Primary | ICD-10-CM

## 2022-03-30 PROCEDURE — 99421 OL DIG E/M SVC 5-10 MIN: CPT | Performed by: OBSTETRICS & GYNECOLOGY

## 2022-03-31 RX ORDER — NORGESTIMATE AND ETHINYL ESTRADIOL 0.25-0.035
1 KIT ORAL DAILY
Qty: 84 TABLET | Refills: 3 | Status: SHIPPED | OUTPATIENT
Start: 2022-03-31 | End: 2023-04-30

## 2022-04-07 ENCOUNTER — TELEPHONE (OUTPATIENT)
Dept: OBGYN | Facility: CLINIC | Age: 32
End: 2022-04-07
Payer: COMMERCIAL

## 2022-04-07 NOTE — TELEPHONE ENCOUNTER
Correspondence received from [facility] about potential duplicate therapy, prescribed Kika and Vonda.   Telephone call to patient to clarify which birth control pills she is currently using. LVM to return call to clinic.     Isabella MARIE RN

## 2022-04-11 ENCOUNTER — VIRTUAL VISIT (OUTPATIENT)
Dept: OBGYN | Facility: CLINIC | Age: 32
End: 2022-04-11
Payer: COMMERCIAL

## 2022-04-11 DIAGNOSIS — N92.1 MENORRHAGIA WITH IRREGULAR CYCLE: Primary | ICD-10-CM

## 2022-04-11 PROCEDURE — 99214 OFFICE O/P EST MOD 30 MIN: CPT | Mod: 95 | Performed by: OBSTETRICS & GYNECOLOGY

## 2022-04-11 NOTE — PROGRESS NOTES
"Rosanna is a 31 year old who is being evaluated via a billable telephone visit.      What phone number would you like to be contacted at? [phone]  How would you like to obtain your AVS? Handy Marte is a 31 year old  referred here by self for consultation regarding abnormal uterine bleeding.  She quit taking her Nordette OCP in December to induce\":natural menses.\" without hormones.  However she did not cycle for 3 months.  She had an E visit with  on 3/31/22 ans was prescribed a higher dose estrogen OCP .Ortho-Cyclen.  She began spotting on 3/31/22  She started her previous Nordette on 22.  She quit the Nordette on 22 and still bleeding.   The bleeding is not heavy and is  without clots  Denies pain.  ROS: Ten point review of systems was reviewed and negative except the above.    Gyne: - abn pap (last pap ), - STD's    Past Medical History:   Diagnosis Date     Mild intermittent asthma without complication      Past Surgical History:   Procedure Laterality Date     implanon  10/17-    removed for weight gain and mood     NO HISTORY OF SURGERY       Patient Active Problem List   Diagnosis     Morbid obesity (H)     Mild intermittent asthma without complication     Migraine without aura and without status migrainosus, not intractable       ALL/Meds: Her medication and allergy histories were reviewed and are documented in their appropriate chart areas.    SH: - tob, - EtOH,     FH: Her family history was reviewed and documented in its appropriate chart area.    PE: LMP 2022   Breastfeeding No   There is no height or weight on file to calculate BMI.      A/P    ICD-10-CM    1. Menorrhagia with irregular cycle  N92.1      I discussed with her that post st amenorrhea menses for 3 months can be heavy and prolonged.    I recommended to hoer to allow the withdrawal bleed to complete and start the Orto-Cyclen prescribed by Dr. Pina on 22.  She may start iron " supplements today.  She should call or be seen in office or go to ED if bleeding worsens.    Total time preparing to see patient with reviewing prior encounter and labs, face to face time,  and coordinating care on the same calendar date: 30 mins  CEPHAS AGBEH, MD.     Phone call duration: 15 minutes

## 2022-04-11 NOTE — PATIENT INSTRUCTIONS
If you have any questions regarding your visit, Please contact your care team.  [facility]: [phone]  Women s Health CLINIC HOURS TELEPHONE NUMBER   Cephas Agbeh, M.D. Becky-RN Kylie-RN Heidi-RN Deanna-MA Angela-  Nita-         Monday-[place]ugen[place]    8:00a.m-4:45 p.m    Tuesday--[place]     8:00a.m-4:45 p.m.    Thursday-Eugenio    8:00a.m-4:45 p.m.    Friday-Eugenio    8:00a.m-4:45 p.m    [facility]   [address]   [place] [address]   [phone]   Fax [phone]   [id number]-[phone]     [facility] Labor and Delivery   [address] [place] [address] [place] [address]   [phone]    [facility]  [address] [facility] [address]  [phone]  [id number]-[phone]   Urgent Care locations:  [facility] Monday-Friday  10 am - 8 pm  Saturday and Sunday   9 am - 5 pm   Monday-Friday   10 am- 8 pm  Saturday and Sunday  9 am - 5 pm    [phone]   If you need a medication refill, please contact your pharmacy. Please allow 3 business days for your refill to be completed.  As always, Thank you for trusting us with your healthcare needs!

## 2022-06-20 ENCOUNTER — E-VISIT (OUTPATIENT)
Dept: PEDIATRICS | Facility: CLINIC | Age: 32
End: 2022-06-20
Payer: COMMERCIAL

## 2022-06-20 DIAGNOSIS — H60.393 INFECTIVE OTITIS EXTERNA, BILATERAL: Primary | ICD-10-CM

## 2022-06-20 PROCEDURE — 99421 OL DIG E/M SVC 5-10 MIN: CPT | Performed by: INTERNAL MEDICINE

## 2022-06-20 RX ORDER — CIPROFLOXACIN AND DEXAMETHASONE 3; 1 MG/ML; MG/ML
4 SUSPENSION/ DROPS AURICULAR (OTIC) 2 TIMES DAILY
Qty: 7.5 ML | Refills: 0 | Status: SHIPPED | OUTPATIENT
Start: 2022-06-20 | End: 2022-11-01

## 2022-07-15 ENCOUNTER — E-VISIT (OUTPATIENT)
Dept: OBGYN | Facility: CLINIC | Age: 32
End: 2022-07-15
Payer: COMMERCIAL

## 2022-07-15 DIAGNOSIS — Z11.3 ROUTINE SCREENING FOR STI (SEXUALLY TRANSMITTED INFECTION): Primary | ICD-10-CM

## 2022-07-15 PROCEDURE — 99421 OL DIG E/M SVC 5-10 MIN: CPT | Performed by: OBSTETRICS & GYNECOLOGY

## 2022-11-01 ENCOUNTER — E-VISIT (OUTPATIENT)
Dept: PEDIATRICS | Facility: CLINIC | Age: 32
End: 2022-11-01
Payer: COMMERCIAL

## 2022-11-01 DIAGNOSIS — H60.393 INFECTIVE OTITIS EXTERNA, BILATERAL: ICD-10-CM

## 2022-11-01 PROCEDURE — 99421 OL DIG E/M SVC 5-10 MIN: CPT | Performed by: INTERNAL MEDICINE

## 2022-11-01 RX ORDER — CIPROFLOXACIN AND DEXAMETHASONE 3; 1 MG/ML; MG/ML
4 SUSPENSION/ DROPS AURICULAR (OTIC) 2 TIMES DAILY
Qty: 7.5 ML | Refills: 0 | Status: SHIPPED | OUTPATIENT
Start: 2022-11-01 | End: 2024-03-10

## 2022-11-11 ENCOUNTER — TELEPHONE (OUTPATIENT)
Dept: BEHAVIORAL HEALTH | Facility: CLINIC | Age: 32
End: 2022-11-11

## 2022-11-11 NOTE — TELEPHONE ENCOUNTER
Writer left a voicemail reminding patient of virtual appointment on 11/17 @ 1:30pm. Provide office line for any questions and intake number for rescheduling or cancellation.

## 2022-11-17 ENCOUNTER — HOSPITAL ENCOUNTER (OUTPATIENT)
Dept: BEHAVIORAL HEALTH | Facility: CLINIC | Age: 32
Discharge: HOME | End: 2022-11-17
Attending: FAMILY MEDICINE
Payer: COMMERCIAL

## 2022-11-17 DIAGNOSIS — F90.0 ADHD (ATTENTION DEFICIT HYPERACTIVITY DISORDER), INATTENTIVE TYPE: Primary | ICD-10-CM

## 2022-11-17 PROCEDURE — 999N000216 HC STATISTIC ADULT CD FACE TO FACE-NO CHRG: Mod: GT,95 | Performed by: PSYCHOLOGIST

## 2022-11-17 ASSESSMENT — COLUMBIA-SUICIDE SEVERITY RATING SCALE - C-SSRS
4. HAVE YOU HAD THESE THOUGHTS AND HAD SOME INTENTION OF ACTING ON THEM?: NO
2. HAVE YOU ACTUALLY HAD ANY THOUGHTS OF KILLING YOURSELF IN THE PAST MONTH?: NO
5. HAVE YOU STARTED TO WORK OUT OR WORKED OUT THE DETAILS OF HOW TO KILL YOURSELF? DO YOU INTEND TO CARRY OUT THIS PLAN?: NO
1. IN THE PAST MONTH, HAVE YOU WISHED YOU WERE DEAD OR WISHED YOU COULD GO TO SLEEP AND NOT WAKE UP?: NO
3. HAVE YOU BEEN THINKING ABOUT HOW YOU MIGHT KILL YOURSELF?: NO
6. HAVE YOU EVER DONE ANYTHING, STARTED TO DO ANYTHING, OR PREPARED TO DO ANYTHING TO END YOUR LIFE?: NO

## 2022-11-17 ASSESSMENT — ANXIETY QUESTIONNAIRES
GAD7 TOTAL SCORE: 10
3. WORRYING TOO MUCH ABOUT DIFFERENT THINGS: MORE THAN HALF THE DAYS
GAD7 TOTAL SCORE: 10
IF YOU CHECKED OFF ANY PROBLEMS ON THIS QUESTIONNAIRE, HOW DIFFICULT HAVE THESE PROBLEMS MADE IT FOR YOU TO DO YOUR WORK, TAKE CARE OF THINGS AT HOME, OR GET ALONG WITH OTHER PEOPLE: VERY DIFFICULT
1. FEELING NERVOUS, ANXIOUS, OR ON EDGE: MORE THAN HALF THE DAYS
GAD7 TOTAL SCORE: 10
6. BECOMING EASILY ANNOYED OR IRRITABLE: SEVERAL DAYS
2. NOT BEING ABLE TO STOP OR CONTROL WORRYING: MORE THAN HALF THE DAYS
4. TROUBLE RELAXING: MORE THAN HALF THE DAYS
5. BEING SO RESTLESS THAT IT IS HARD TO SIT STILL: SEVERAL DAYS
7. FEELING AFRAID AS IF SOMETHING AWFUL MIGHT HAPPEN: NOT AT ALL
8. IF YOU CHECKED OFF ANY PROBLEMS, HOW DIFFICULT HAVE THESE MADE IT FOR YOU TO DO YOUR WORK, TAKE CARE OF THINGS AT HOME, OR GET ALONG WITH OTHER PEOPLE?: VERY DIFFICULT
7. FEELING AFRAID AS IF SOMETHING AWFUL MIGHT HAPPEN: NOT AT ALL

## 2022-11-17 ASSESSMENT — PATIENT HEALTH QUESTIONNAIRE - PHQ9
10. IF YOU CHECKED OFF ANY PROBLEMS, HOW DIFFICULT HAVE THESE PROBLEMS MADE IT FOR YOU TO DO YOUR WORK, TAKE CARE OF THINGS AT HOME, OR GET ALONG WITH OTHER PEOPLE: VERY DIFFICULT
SUM OF ALL RESPONSES TO PHQ QUESTIONS 1-9: 17
SUM OF ALL RESPONSES TO PHQ QUESTIONS 1-9: 17
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
## 2022-11-17 NOTE — PATIENT INSTRUCTIONS
Dear Rosanna,    This is a reminder that an appointment for mental health services has been scheduled for you.  Please contact your insurance company directly to verify coverage, eligibility, payment, and  benefit information.    Appointment Date: 11/22/2022  Appointment Time: 2:00 PM  Location: [place], [facility]  Tima Archibald LP  [address] [place] [address]  [phone]    For directions and/or questions regarding the appointment, please contact the clinic or provider  directly at the phone number listed above.    Your appointment is scheduled outside of [facility]. Behavioral Healthcare  Providers ([facility]) maintains an extensive network of licensed behavioral health providers to  connect patients with the services they need. We do not charge providers a fee to participate  in our referral network. We match patients with providers based on a patient s specific  treatment needs, insurance coverage, and location. Our first effort will be to refer you to a  provider within your care system and will utilize providers outside of your care system as  needed.    Sincerely,  [facility] Behavioral Health Access Staff  [facility]  [address] [place] [id number]  [phone]

## 2022-11-20 ENCOUNTER — HEALTH MAINTENANCE LETTER (OUTPATIENT)
Age: 32
End: 2022-11-20

## 2022-11-29 ENCOUNTER — E-VISIT (OUTPATIENT)
Dept: PEDIATRICS | Facility: CLINIC | Age: 32
End: 2022-11-29
Payer: COMMERCIAL

## 2022-11-29 DIAGNOSIS — J02.9 SORE THROAT: Primary | ICD-10-CM

## 2022-11-29 PROCEDURE — 99421 OL DIG E/M SVC 5-10 MIN: CPT | Performed by: INTERNAL MEDICINE

## 2022-11-29 RX ORDER — BENZONATATE 100 MG/1
100 CAPSULE ORAL 3 TIMES DAILY PRN
Qty: 15 CAPSULE | Refills: 0 | Status: SHIPPED | OUTPATIENT
Start: 2022-11-29 | End: 2023-10-19

## 2022-12-03 ENCOUNTER — E-VISIT (OUTPATIENT)
Dept: PEDIATRICS | Facility: CLINIC | Age: 32
End: 2022-12-03
Payer: COMMERCIAL

## 2022-12-03 DIAGNOSIS — J45.21 MILD INTERMITTENT ASTHMA WITH EXACERBATION: ICD-10-CM

## 2022-12-03 PROCEDURE — 99421 OL DIG E/M SVC 5-10 MIN: CPT | Performed by: INTERNAL MEDICINE

## 2022-12-13 DIAGNOSIS — J45.21 MILD INTERMITTENT ASTHMA WITH EXACERBATION: ICD-10-CM

## 2022-12-13 RX ORDER — ALBUTEROL SULFATE 90 UG/1
1-2 AEROSOL, METERED RESPIRATORY (INHALATION) EVERY 4 HOURS PRN
Qty: 18 G | Refills: 2 | Status: SHIPPED | OUTPATIENT
Start: 2022-12-13 | End: 2024-07-19

## 2022-12-13 RX ORDER — FLUTICASONE PROPIONATE 220 UG/1
1 AEROSOL, METERED RESPIRATORY (INHALATION) 2 TIMES DAILY
Qty: 12 G | Refills: 5 | Status: SHIPPED | OUTPATIENT
Start: 2022-12-13 | End: 2022-12-15

## 2022-12-13 NOTE — TELEPHONE ENCOUNTER
Patient has not responded yet-I called her again and  for her- asked to complete ACT.    Senait Garcia RN

## 2022-12-13 NOTE — TELEPHONE ENCOUNTER
Call to patient-discussed with Rosanna that I am sending a [employer] message to her with the ACT per our protocol, unable to complete over the phone due to copyrights.  Patient is logged on to my-chart, so will watch for it to come in.  She has my direct line and will call with issues.  Will route to PCP once done.  Senait Garcia RN

## 2022-12-14 RX ORDER — ALBUTEROL SULFATE 90 UG/1
1-2 AEROSOL, METERED RESPIRATORY (INHALATION) EVERY 4 HOURS PRN
Qty: 18 G | Refills: 2 | Status: CANCELLED | OUTPATIENT
Start: 2022-12-14

## 2022-12-14 NOTE — TELEPHONE ENCOUNTER
Dr. Nieves     Please see note from pharmacy     Thank you,   Mikala Jose, Registered Nurse  [facility]

## 2022-12-14 NOTE — TELEPHONE ENCOUNTER
Alternative requested. Patients ins requires levabuterol please send new script or do pa for albuterol.     Alternative requested. The prescribed medication is not covered by insurance. Please consider changing to one of the suggested covered alteratives.

## 2022-12-15 RX ORDER — FLUTICASONE PROPIONATE 220 UG/1
1 AEROSOL, METERED RESPIRATORY (INHALATION) 2 TIMES DAILY
Qty: 12 G | Refills: 5 | Status: SHIPPED | OUTPATIENT
Start: 2022-12-15 | End: 2023-06-16

## 2022-12-15 RX ORDER — LEVALBUTEROL TARTRATE 45 UG/1
2 AEROSOL, METERED ORAL EVERY 4 HOURS PRN
Qty: 15 G | Refills: 3 | Status: SHIPPED | OUTPATIENT
Start: 2022-12-15 | End: 2024-07-19

## 2022-12-16 NOTE — TELEPHONE ENCOUNTER
Called patient with the suggestions, and left our clinic number for patient to call back with his questions, or if insurance is unable to cover, then her other choices are to be paid out of pocket.         Insurance/patient requires :Levalbuterol    Orders signed.      Toshia Nelson MA

## 2023-02-22 ENCOUNTER — DOCUMENTATION ONLY (OUTPATIENT)
Dept: BEHAVIORAL HEALTH | Facility: CLINIC | Age: 33
End: 2023-02-22
Payer: COMMERCIAL

## 2023-02-22 NOTE — PROGRESS NOTES
Patient was scheduled for a new psychotherapy appointment.  Patient did not respond either by virtual or phone.  Message left for patient to reschedule at her convenience.    Trisha Vega [facility]  2/22/23

## 2023-03-30 ENCOUNTER — TELEPHONE (OUTPATIENT)
Dept: PEDIATRICS | Facility: CLINIC | Age: 33
End: 2023-03-30
Payer: COMMERCIAL

## 2023-03-30 DIAGNOSIS — E66.01 MORBID OBESITY (H): Primary | ICD-10-CM

## 2023-03-30 NOTE — TELEPHONE ENCOUNTER
Patient completed a Patient Customer Service Request regarding a referral (See below for patient's request/message)     Referral   Rosanna Leung  Patient Customer Service Request Pool 2 days ago       Topic: Non-Medical Question.     Hello,     I have started to see a nutritionist for my weight loss program and they said that I need to give Dr. Osvlado Nieves this referral to my insurance. I have attached the referral to this message. Can you please send this over to his assistant so that I can get my insurance to cover my appointments with my nutritionist? Thank you    Attachments   Letter to Rosanna wright pdf format.pdf    Routing to Shaina Wolff, Referral Coordinator to please review and advise.     Harriet JONES RN   Patient Advocate Liaison (PAL)  [facility]   [phone]

## 2023-03-30 NOTE — TELEPHONE ENCOUNTER
Waiting for patient to call back with more information about the OON referral to a nutritionist.Patient has a narrow network insurance and [facility] is her network.     Niki MATA-Referral Coordinator

## 2023-04-04 NOTE — TELEPHONE ENCOUNTER
Dr. Nieves,  Please see patient message below. Patient is being seen by a nutritionist at [facility] in [place]. Patient thought see could self refer but has a managed care insurance and [facility] is her network. She looked on her insurance web site and thought this clinic was in network but it was for the bigger [facility] network. Since this is out of network I need to know if you approve this referral. If you do then I need to send it to our Medical Review   Team for approval or denial. But a referral has to be placed in Epic first. I would like to help the patient since she has started the program. Please advise.    Thank you,  Niki MATA-Referral Coordinator

## 2023-04-06 NOTE — TELEPHONE ENCOUNTER
-did you see my message below. Waiting to see if you approve the nutrition referral? If you approve can you please place a referral in [facility] so I can send to the Medical Review Team for approval since this clinic is out of network.     Thank you,  Niki -referral coordinator

## 2023-04-11 ENCOUNTER — E-VISIT (OUTPATIENT)
Dept: PEDIATRICS | Facility: CLINIC | Age: 33
End: 2023-04-11
Payer: COMMERCIAL

## 2023-04-11 DIAGNOSIS — G44.209 TENSION HEADACHE: Primary | ICD-10-CM

## 2023-04-11 PROCEDURE — 99207 PR NON-BILLABLE SERV PER CHARTING: CPT | Performed by: INTERNAL MEDICINE

## 2023-04-15 ENCOUNTER — HEALTH MAINTENANCE LETTER (OUTPATIENT)
Age: 33
End: 2023-04-15

## 2023-04-25 NOTE — TELEPHONE ENCOUNTER
Called the patient at [phone] and left a message requesting a call back   - Awaiting a call back at this time     When the patient calls back:   - Inform the patient that Dr. Nieves placed the Nutrition Referral to the [facility] in [place]   - Inform the patient that per Suzy Bryant Coordinator her out of network request has been reviewed by the Medical Review Team and it has been denied   - Inform the patient that Suzy Bryant Coordinator states that she can file an appeal with her insurance     Harriet JONES RN   Patient Advocate Liaison (PAL)  [facility]   [phone]

## 2023-04-25 NOTE — TELEPHONE ENCOUNTER
OON request has been denied by the medical review team.  Pt can file an appeal with pt's insurance.      Thank you,     Shaina

## 2023-04-27 NOTE — TELEPHONE ENCOUNTER
Second attempt to reach the patient.     Called the patient at [phone] and left a message requesting a call back   - Awaiting a call back at this time     Harriet JONES RN   Patient Advocate Liaison (PAL)  [facility]   [phone]

## 2023-04-30 ENCOUNTER — E-VISIT (OUTPATIENT)
Dept: OBGYN | Facility: CLINIC | Age: 33
End: 2023-04-30
Payer: COMMERCIAL

## 2023-04-30 DIAGNOSIS — N92.4 EXCESSIVE BLEEDING IN PREMENOPAUSAL PERIOD: Primary | ICD-10-CM

## 2023-04-30 PROCEDURE — 99421 OL DIG E/M SVC 5-10 MIN: CPT | Performed by: OBSTETRICS & GYNECOLOGY

## 2023-05-01 ENCOUNTER — MYC MEDICAL ADVICE (OUTPATIENT)
Dept: FAMILY MEDICINE | Facility: CLINIC | Age: 33
End: 2023-05-01
Payer: COMMERCIAL

## 2023-05-15 ENCOUNTER — HOSPITAL ENCOUNTER (OUTPATIENT)
Dept: ULTRASOUND IMAGING | Facility: HOSPITAL | Age: 33
Discharge: HOME | End: 2023-05-15
Attending: OBSTETRICS & GYNECOLOGY | Admitting: OBSTETRICS & GYNECOLOGY
Payer: COMMERCIAL

## 2023-05-15 ENCOUNTER — MYC MEDICAL ADVICE (OUTPATIENT)
Dept: PEDIATRICS | Facility: CLINIC | Age: 33
End: 2023-05-15
Payer: COMMERCIAL

## 2023-05-15 DIAGNOSIS — N92.4 EXCESSIVE BLEEDING IN PREMENOPAUSAL PERIOD: ICD-10-CM

## 2023-05-15 PROCEDURE — 76856 US EXAM PELVIC COMPLETE: CPT

## 2023-05-16 DIAGNOSIS — N83.201 CYST OF RIGHT OVARY: Primary | ICD-10-CM

## 2023-05-16 NOTE — TELEPHONE ENCOUNTER
See patient's [employer] message   - Patient requesting a refill of her fluticasone (FLOVENT HFA) 220 MCG/ACT inhaler and albuterol (PROAIR HFA) 108 (90 Base) MCG/ACT inhaler medications   - Patient is scheduled for an upcoming appointment on 5/30/2023 with Dr. Nieves     fluticasone (FLOVENT HFA) 220 MCG/ACT inhaler 12 g 5 12/15/2022  No   Sig - Route: Inhale 1 puff into the lungs 2 times daily - Inhalation     albuterol (PROAIR HFA) 108 (90 Base) MCG/ACT inhaler 18 g 2 12/13/2022  No   Sig - Route: Inhale 1-2 puffs into the lungs every 4 hours as needed for shortness of breath or wheezing - Inhalation     Appointments in Next Year    May 30, 2023  3:00 PM  (Arrive by 2:40 PM)  Preventative Adult Visit with Osvaldo Nieves MD  [facility] ([facility] ) [phone]     Called the [facility] in [facility] in [place] at [phone] and spoke with Victor Manuel Pharmacist   - Victor Manuel, Pharmacist states that the patient has refills of her fluticasone (FLOVENT HFA) 220 MCG/ACT inhaler on file and states that when he runs it as the brand name it goes through for a 60 day supply     Sent a [employer] message to the patient informing her that her fluticasone (FLOVENT HFA) 220 MCG/ACT inhaler should be ready for pickup at the [place] [address] [place] [facility] [place] [place] - [facility] FERNANDO Soni RN   Patient Advocate Liaison (PAL)  [facility]   [phone]

## 2023-06-14 DIAGNOSIS — J45.21 MILD INTERMITTENT ASTHMA WITH EXACERBATION: ICD-10-CM

## 2023-06-15 ENCOUNTER — MYC MEDICAL ADVICE (OUTPATIENT)
Dept: PEDIATRICS | Facility: CLINIC | Age: 33
End: 2023-06-15
Payer: COMMERCIAL

## 2023-06-15 DIAGNOSIS — J45.21 MILD INTERMITTENT ASTHMA WITH EXACERBATION: ICD-10-CM

## 2023-06-15 NOTE — TELEPHONE ENCOUNTER
Routing refill request to provider for review/approval because:  Patient does not have an Asthma Control Assessment score on file in the past 6 months.         11/14/2019     8:00 PM 4/21/2021     3:10 PM 12/13/2021     2:17 PM   ACT Total Scores   ACT TOTAL SCORE (Goal Greater than or Equal to 20) 14 16 14   In the past 12 months, how many times did you visit the emergency room for your asthma without being admitted to the hospital? 0 0 1   In the past 12 months, how many times were you hospitalized overnight because of your asthma? 0 0 0     Appointments in Next Year      Aug 07, 2023  2:30 PM  (Arrive by 2:10 PM)  Preventative Adult [facility] ) [phone]          Harriet JONES RN   [facility]

## 2023-06-15 NOTE — TELEPHONE ENCOUNTER
See patient's [employer] message   - Patient requesting a refill of her fluticasone (FLOVENT HFA) 220 MCG/ACT inhaler   - See 6/14/2023 Refill Encounter   - Sent a [employer] message to the patient informing her that her medication refill request has been routed to her provider to review     Closing this encounter as it is being addressed in the 6/14/2023 Refill Encounter.     Harriet JONES RN   Patient Advocate Liaison (PAL)  [facility]   [phone]

## 2023-06-16 RX ORDER — FLUTICASONE PROPIONATE 220 UG/1
AEROSOL, METERED RESPIRATORY (INHALATION)
Qty: 12 G | Refills: 1 | Status: SHIPPED | OUTPATIENT
Start: 2023-06-16 | End: 2023-06-22

## 2023-06-22 RX ORDER — FLUTICASONE PROPIONATE 220 UG/1
AEROSOL, METERED RESPIRATORY (INHALATION)
Qty: 12 G | Refills: 1 | Status: SHIPPED | OUTPATIENT
Start: 2023-06-22 | End: 2024-03-14

## 2023-06-22 NOTE — TELEPHONE ENCOUNTER
Patient checking of the script was sent to the pharmacy.  Sent now as the script sent on 6/16 failed: Transmission to pharmacy failed (6/16/2023  9:16 AM CDT  Received confirmation today: Receipt confirmed by pharmacy (6/22/2023 12:44 PM CDT  Patient informed.  Senait Garcia RN'

## 2023-06-22 NOTE — TELEPHONE ENCOUNTER
Resent this refill as transmission to the pharmacy failed.   message sent to patient.  Senait Garcia RN

## 2023-06-29 ENCOUNTER — MYC MEDICAL ADVICE (OUTPATIENT)
Dept: PEDIATRICS | Facility: CLINIC | Age: 33
End: 2023-06-29
Payer: COMMERCIAL
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
## 2023-10-19 ENCOUNTER — MYC REFILL (OUTPATIENT)
Dept: PEDIATRICS | Facility: CLINIC | Age: 33
End: 2023-10-19
Payer: COMMERCIAL

## 2023-10-19 DIAGNOSIS — J02.9 SORE THROAT: ICD-10-CM

## 2023-10-19 RX ORDER — BENZONATATE 100 MG/1
100 CAPSULE ORAL 3 TIMES DAILY PRN
Qty: 15 CAPSULE | Refills: 0 | Status: SHIPPED | OUTPATIENT
Start: 2023-10-19 | End: 2024-07-19

## 2024-01-03 ENCOUNTER — MYC MEDICAL ADVICE (OUTPATIENT)
Dept: OBGYN | Facility: CLINIC | Age: 34
End: 2024-01-03

## 2024-01-03 ENCOUNTER — MYC REFILL (OUTPATIENT)
Dept: OBGYN | Facility: CLINIC | Age: 34
End: 2024-01-03

## 2024-01-03 DIAGNOSIS — N93.8 DUB (DYSFUNCTIONAL UTERINE BLEEDING): ICD-10-CM

## 2024-01-03 RX ORDER — LEVONORGESTREL AND ETHINYL ESTRADIOL 0.15-0.03
1 KIT ORAL DAILY
Qty: 84 TABLET | Refills: 4 | OUTPATIENT
Start: 2024-01-03

## 2024-01-04 ENCOUNTER — VIRTUAL VISIT (OUTPATIENT)
Dept: MIDWIFE SERVICES | Facility: CLINIC | Age: 34
End: 2024-01-04
Payer: COMMERCIAL

## 2024-01-04 DIAGNOSIS — N83.201 RIGHT OVARIAN CYST: Primary | ICD-10-CM

## 2024-01-04 DIAGNOSIS — N93.8 DUB (DYSFUNCTIONAL UTERINE BLEEDING): ICD-10-CM

## 2024-01-04 PROCEDURE — 99213 OFFICE O/P EST LOW 20 MIN: CPT | Mod: 93 | Performed by: ADVANCED PRACTICE MIDWIFE

## 2024-01-04 RX ORDER — LEVONORGESTREL AND ETHINYL ESTRADIOL 0.15-0.03
1 KIT ORAL DAILY
Qty: 84 TABLET | Refills: 4 | Status: SHIPPED | OUTPATIENT
Start: 2024-01-04 | End: 2024-09-20

## 2024-01-04 NOTE — PROGRESS NOTES
Rosanna is a 33 year old who is being evaluated via a billable telephone visit.      How would you like to obtain your AVS? MyChart          Assessment & Plan     (N83.201) Right ovarian cyst  (primary encounter diagnosis)  Comment:   Plan: US Transvaginal Pelvic Non-OB            (N93.8) DUB (dysfunctional uterine bleeding)  Comment:   Plan: levonorgestrel-ethinyl estradiol (NORDETTE)         0.15-30 MG-MCG tablet            Review of the result(s) of each unique test - last pelvic US  No LOS data to display   Time spent by me doing chart review, history and exam, documentation and further activities per the note       MEDICATIONS:  Continue current medications without change. Refill given    No follow-ups on file.    Vandana Douglas, CLARAM, APRN CNM  [facility]    Remy aMrte is a 33 year old, presenting for the following health issues:  No chief complaint on file.    HPI   Rosanna is out of her OCPs which she uses to manage her irregular period. She is due for a pap smear and has scheduled this in January. She also needs to schedule an ultrasound to follow up on a complex ovarian cyst that was seen this summer.       Review of Systems   CONSTITUTIONAL: NEGATIVE for fever, chills, change in weight  ENT/MOUTH: NEGATIVE for ear, mouth and throat problems  RESP: NEGATIVE for significant cough or SOB  CV: NEGATIVE for chest pain, palpitations or peripheral edema      Objective           Vitals:  No vitals were obtained today due to virtual visit.    Physical Exam   GENERAL: Healthy, alert and no distress  EYES: Eyes grossly normal to inspection.  No discharge or erythema, or obvious scleral/conjunctival abnormalities.  RESP: No audible wheeze, cough, or visible cyanosis.  No visible retractions or increased work of breathing.    SKIN: Visible skin clear. No significant rash, abnormal pigmentation or lesions.  NEURO: Cranial nerves grossly intact.  Mentation and speech appropriate  for age.  PSYCH: Mentation appears normal, affect normal/bright, judgement and insight intact, normal speech and appearance well-groomed.            Telephone Visit Details    Type of service:  Telephone Visit     Originating Location (pt. Location): Home    Distant Location (provider location):  On-site  Platform used for Visit: Telephone    20 minutes spent on chart review, telephone call with patient and charting.   Vandana Douglas, CLARAM, APRN CNM

## 2024-01-14 ENCOUNTER — MYC MEDICAL ADVICE (OUTPATIENT)
Dept: OBGYN | Facility: CLINIC | Age: 34
End: 2024-01-14

## 2024-01-14 DIAGNOSIS — N89.8 VAGINAL DRYNESS: Primary | ICD-10-CM

## 2024-01-15 NOTE — TELEPHONE ENCOUNTER
Pt started new OCP  Intermittently having slight pain during intercourse  Period last month lasted longer, but had skipped period 2 months prior    Pt wondering if should switch to something like IUD since not liking experiences on   Any concerns with pain with intercourse or okay to wait until end of the month-getting a PAP with [facility] in [place]  Routing to provider to advise.  Susan Lee RN on 1/15/2024 at 10:40 AM

## 2024-01-16 RX ORDER — GLYCERIN/PROPYLENE GLYCOL/WATR
SOLUTION, NON-ORAL VAGINAL DAILY PRN
Qty: 66.5 G | Refills: 4 | Status: SHIPPED | OUTPATIENT
Start: 2024-01-16

## 2024-01-16 NOTE — TELEPHONE ENCOUNTER
I am not sure if a prescription for lubricant will go through?    I teed up prescription and just need the pharmacy entered.  We can try it    Rosalba Pina MD

## 2024-01-23 ENCOUNTER — VIRTUAL VISIT (OUTPATIENT)
Dept: FAMILY MEDICINE | Facility: CLINIC | Age: 34
End: 2024-01-23
Payer: COMMERCIAL

## 2024-01-23 ENCOUNTER — MYC MEDICAL ADVICE (OUTPATIENT)
Dept: FAMILY MEDICINE | Facility: CLINIC | Age: 34
End: 2024-01-23

## 2024-01-23 DIAGNOSIS — J06.9 UPPER RESPIRATORY TRACT INFECTION, UNSPECIFIED TYPE: Primary | ICD-10-CM

## 2024-01-23 PROCEDURE — 99213 OFFICE O/P EST LOW 20 MIN: CPT | Mod: 95

## 2024-01-23 RX ORDER — FLUTICASONE PROPIONATE 50 MCG
1 SPRAY, SUSPENSION (ML) NASAL DAILY
Qty: 16 G | Refills: 0 | Status: SHIPPED | OUTPATIENT
Start: 2024-01-23 | End: 2024-07-19

## 2024-01-23 ASSESSMENT — ASTHMA QUESTIONNAIRES
QUESTION_4 LAST FOUR WEEKS HOW OFTEN HAVE YOU USED YOUR RESCUE INHALER OR NEBULIZER MEDICATION (SUCH AS ALBUTEROL): NOT AT ALL
QUESTION_5 LAST FOUR WEEKS HOW WOULD YOU RATE YOUR ASTHMA CONTROL: WELL CONTROLLED
QUESTION_1 LAST FOUR WEEKS HOW MUCH OF THE TIME DID YOUR ASTHMA KEEP YOU FROM GETTING AS MUCH DONE AT WORK, SCHOOL OR AT HOME: NONE OF THE TIME
ACT_TOTALSCORE: 24
ACT_TOTALSCORE: 24
QUESTION_3 LAST FOUR WEEKS HOW OFTEN DID YOUR ASTHMA SYMPTOMS (WHEEZING, COUGHING, SHORTNESS OF BREATH, CHEST TIGHTNESS OR PAIN) WAKE YOU UP AT NIGHT OR EARLIER THAN USUAL IN THE MORNING: NOT AT ALL
QUESTION_2 LAST FOUR WEEKS HOW OFTEN HAVE YOU HAD SHORTNESS OF BREATH: NOT AT ALL

## 2024-01-23 NOTE — PROGRESS NOTES
Rosanna is a 33 year old who is being evaluated via a billable video visit.      How would you like to obtain your AVS? MyChart  If the video visit is dropped, the invitation should be resent by: Text to cell phone: [phone]  Will anyone else be joining your video visit? Yes: . How would they like to receive their invitation? Text to cell phone: [phone]    Assessment & Plan     Upper respiratory tract infection, unspecified type  Day 5 of URI symptoms. Most likely viral, low suspicion for bacterial etiology. Discussed supportive treatments. Rest, drinking plenty of fluids, tylenol, ibuprofen as needed.   - fluticasone (FLONASE) 50 MCG/ACT nasal spray; Spray 1 spray into both nostrils daily    Plan to follow up if symptoms do not improve or worsen.     Subjective   Rosanna is a 33 year old, presenting for the following health issues:  URI (Sinus pressure, sneezing, fevers and fatigue x 1-2 weeks )          URI    History of Present Illness       Reason for visit:  Sinus Problem  Symptom onset:  1-2 weeks ago  Symptoms include:  Sinus pressure, headaches, sneezing, cough, and had fevers  Symptom intensity:  Moderate  Symptom progression:  Improving  Had these symptoms before:  No  What makes it worse:  Sneezing  What makes it better:  Resting and Tylenol as needed    She eats 2-3 servings of fruits and vegetables daily.She consumes 0 sweetened beverage(s) daily.She exercises with enough effort to increase her heart rate 20 to 29 minutes per day.  She exercises with enough effort to increase her heart rate 5 days per week.   She is taking medications regularly.     Traveling two weeks ago and when returned started having symptoms. Thinks she may have gotten sick from exposure in the airport.   Started with cough and sore throat, body aches, chills last Wednesday.  Hurt to drink or swallow for 3 days. This has improved.  Been taking Theraflu and tylenol cold/flu  Throat better but now sinus issues, sneezing. Back  hurts when she's sneezing.  Felt feverish today, heart racing.  Some dizziness when in the shower.       Review of Systems  Constitutional, HEENT, cardiovascular, pulmonary, gi and gu systems are negative, except as otherwise noted.      Objective           Vitals:  No vitals were obtained today due to virtual visit.    Physical Exam   GENERAL: alert and no distress  HENT: normal cephalic/atraumatic and nasal congestion  RESP: No audible wheeze, cough, or visible cyanosis.    SKIN: Visible skin clear. No significant rash, abnormal pigmentation or lesions.  NEURO: Cranial nerves grossly intact.  Mentation and speech appropriate for age.  PSYCH: Appropriate affect, tone, and pace of words        Video-Visit Details    Type of service:  Video Visit   Originating Location (pt. Location): Home  Distant Location (provider location):  On-site  Platform used for Video Visit: Eliel    Signed Electronically by: TEGAN Valle CNP

## 2024-03-10 ENCOUNTER — MYC REFILL (OUTPATIENT)
Dept: PEDIATRICS | Facility: CLINIC | Age: 34
End: 2024-03-10

## 2024-03-10 DIAGNOSIS — H60.393 INFECTIVE OTITIS EXTERNA, BILATERAL: ICD-10-CM

## 2024-03-11 RX ORDER — CIPROFLOXACIN AND DEXAMETHASONE 3; 1 MG/ML; MG/ML
4 SUSPENSION/ DROPS AURICULAR (OTIC) 2 TIMES DAILY
Qty: 7.5 ML | Refills: 0 | Status: SHIPPED | OUTPATIENT
Start: 2024-03-11 | End: 2024-07-19

## 2024-03-14 ENCOUNTER — TELEPHONE (OUTPATIENT)
Dept: PEDIATRICS | Facility: CLINIC | Age: 34
End: 2024-03-14

## 2024-03-14 DIAGNOSIS — J45.21 MILD INTERMITTENT ASTHMA WITH EXACERBATION: Primary | ICD-10-CM

## 2024-03-14 DIAGNOSIS — J45.21 MILD INTERMITTENT ASTHMA WITH EXACERBATION: ICD-10-CM

## 2024-03-14 RX ORDER — FLUTICASONE PROPIONATE 220 UG/1
AEROSOL, METERED RESPIRATORY (INHALATION)
Qty: 12 G | Refills: 4 | Status: SHIPPED | OUTPATIENT
Start: 2024-03-14 | End: 2024-07-19

## 2024-03-14 NOTE — TELEPHONE ENCOUNTER
Alternative requested the medication below is not covered by insurance.     Disp Refills Start End DUSTIN   fluticasone (FLOVENT HFA) 220 MCG/ACT inhaler 12 g 4 3/14/2024 -- No   Sig: TAKE 1 PUFF BY MOUTH TWICE A DAY   Sent to pharmacy as: Fluticasone Propionate  MCG/ACT Inhalation Aerosol   Class: E-Prescribe   Notes to Pharmacy: Pharmacy may dispense brand if preferred by insurance.   Order: [id number]   E-Prescribing Status: Receipt confirmed by pharmacy (3/14/2024  1:49 PM CDT)     Printout Tracking    External Result Report     Medication Administration Instructions    TAKE 1 PUFF BY MOUTH TWICE A DAY     Pharmacy    [id number] - [place], [place] - [address]     Associated Diagnoses    Mild intermittent asthma with exacerbation [J45.21]

## 2024-03-15 NOTE — TELEPHONE ENCOUNTER
Spoke with pharmacy who is not aware of covered alternatives.     Reports patient was sent a notification that medication is not covered. Routing to PA team for review.

## 2024-03-26 NOTE — TELEPHONE ENCOUNTER
PA Initiation    Medication: FLUTICASONE PROPIONATE  MCG/ACT IN AERO  Insurance Company: [employer] - Phone [phone] Fax [phone]  Pharmacy Filling the Rx: [id number] - [place], [place] - [address]  Filling Pharmacy Phone: [phone]  Filling Pharmacy Fax:    Start Date: 3/26/2024

## 2024-03-27 NOTE — TELEPHONE ENCOUNTER
Attempted to contact patient to relay information below from Amanda. No answer. Left generic message with instructions to call back with any further questions.

## 2024-03-27 NOTE — TELEPHONE ENCOUNTER
PRIOR AUTHORIZATION DENIED    Medication: FLUTICASONE PROPIONATE  MCG/ACT IN AERO  Insurance Company: [employer] - Phone [phone] Fax [phone]  Denial Date: 3/27/2024  Denial Reason(s): Needs to try/fail Pulmicort      Appeal Information:   Patient Notified: No

## 2024-06-22 ENCOUNTER — HEALTH MAINTENANCE LETTER (OUTPATIENT)
Age: 34
End: 2024-06-22

## 2024-07-18 ASSESSMENT — SOCIAL DETERMINANTS OF HEALTH (SDOH): HOW OFTEN DO YOU GET TOGETHER WITH FRIENDS OR RELATIVES?: PATIENT DECLINED

## 2024-07-19 ENCOUNTER — OFFICE VISIT (OUTPATIENT)
Dept: PEDIATRICS | Facility: CLINIC | Age: 34
End: 2024-07-19
Payer: COMMERCIAL

## 2024-07-19 ENCOUNTER — MYC MEDICAL ADVICE (OUTPATIENT)
Dept: PEDIATRICS | Facility: CLINIC | Age: 34
End: 2024-07-19

## 2024-07-19 VITALS
BODY MASS INDEX: 49.77 KG/M2 | RESPIRATION RATE: 16 BRPM | HEIGHT: 63 IN | WEIGHT: 280.9 LBS | DIASTOLIC BLOOD PRESSURE: 81 MMHG | TEMPERATURE: 97.4 F | SYSTOLIC BLOOD PRESSURE: 123 MMHG | OXYGEN SATURATION: 99 % | HEART RATE: 102 BPM

## 2024-07-19 DIAGNOSIS — Z00.00 ROUTINE GENERAL MEDICAL EXAMINATION AT A HEALTH CARE FACILITY: Primary | ICD-10-CM

## 2024-07-19 DIAGNOSIS — Z11.4 SCREENING FOR HIV (HUMAN IMMUNODEFICIENCY VIRUS): ICD-10-CM

## 2024-07-19 DIAGNOSIS — E66.01 MORBID OBESITY (H): ICD-10-CM

## 2024-07-19 DIAGNOSIS — J45.21 MILD INTERMITTENT ASTHMA WITH EXACERBATION: ICD-10-CM

## 2024-07-19 DIAGNOSIS — Z12.4 CERVICAL CANCER SCREENING: ICD-10-CM

## 2024-07-19 DIAGNOSIS — E11.9 TYPE 2 DIABETES MELLITUS WITHOUT COMPLICATION, WITHOUT LONG-TERM CURRENT USE OF INSULIN (H): Primary | ICD-10-CM

## 2024-07-19 DIAGNOSIS — G47.00 INSOMNIA, UNSPECIFIED TYPE: ICD-10-CM

## 2024-07-19 DIAGNOSIS — Z11.59 NEED FOR HEPATITIS C SCREENING TEST: ICD-10-CM

## 2024-07-19 DIAGNOSIS — R73.01 IFG (IMPAIRED FASTING GLUCOSE): ICD-10-CM

## 2024-07-19 LAB
BASOPHILS # BLD AUTO: 0 10E3/UL (ref 0–0.2)
BASOPHILS NFR BLD AUTO: 1 %
EOSINOPHIL # BLD AUTO: 0.1 10E3/UL (ref 0–0.7)
EOSINOPHIL NFR BLD AUTO: 2 %
ERYTHROCYTE [DISTWIDTH] IN BLOOD BY AUTOMATED COUNT: 13.3 % (ref 10–15)
HBA1C MFR BLD: 6.7 % (ref 0–5.6)
HCT VFR BLD AUTO: 40.5 % (ref 35–47)
HGB BLD-MCNC: 12.7 G/DL (ref 11.7–15.7)
IMM GRANULOCYTES # BLD: 0 10E3/UL
IMM GRANULOCYTES NFR BLD: 0 %
LYMPHOCYTES # BLD AUTO: 3.1 10E3/UL (ref 0.8–5.3)
LYMPHOCYTES NFR BLD AUTO: 47 %
MCH RBC QN AUTO: 25.9 PG (ref 26.5–33)
MCHC RBC AUTO-ENTMCNC: 31.4 G/DL (ref 31.5–36.5)
MCV RBC AUTO: 83 FL (ref 78–100)
MONOCYTES # BLD AUTO: 0.3 10E3/UL (ref 0–1.3)
MONOCYTES NFR BLD AUTO: 5 %
NEUTROPHILS # BLD AUTO: 3 10E3/UL (ref 1.6–8.3)
NEUTROPHILS NFR BLD AUTO: 46 %
PLATELET # BLD AUTO: 318 10E3/UL (ref 150–450)
RBC # BLD AUTO: 4.91 10E6/UL (ref 3.8–5.2)
WBC # BLD AUTO: 6.7 10E3/UL (ref 4–11)

## 2024-07-19 PROCEDURE — 83036 HEMOGLOBIN GLYCOSYLATED A1C: CPT | Performed by: INTERNAL MEDICINE

## 2024-07-19 PROCEDURE — 80053 COMPREHEN METABOLIC PANEL: CPT | Performed by: INTERNAL MEDICINE

## 2024-07-19 PROCEDURE — 99214 OFFICE O/P EST MOD 30 MIN: CPT | Mod: 25 | Performed by: INTERNAL MEDICINE

## 2024-07-19 PROCEDURE — 36415 COLL VENOUS BLD VENIPUNCTURE: CPT | Performed by: INTERNAL MEDICINE

## 2024-07-19 PROCEDURE — 99395 PREV VISIT EST AGE 18-39: CPT | Mod: 25 | Performed by: INTERNAL MEDICINE

## 2024-07-19 PROCEDURE — 80061 LIPID PANEL: CPT | Performed by: INTERNAL MEDICINE

## 2024-07-19 PROCEDURE — 90471 IMMUNIZATION ADMIN: CPT | Performed by: INTERNAL MEDICINE

## 2024-07-19 PROCEDURE — 85025 COMPLETE CBC W/AUTO DIFF WBC: CPT | Performed by: INTERNAL MEDICINE

## 2024-07-19 PROCEDURE — 84443 ASSAY THYROID STIM HORMONE: CPT | Performed by: INTERNAL MEDICINE

## 2024-07-19 PROCEDURE — 90677 PCV20 VACCINE IM: CPT | Performed by: INTERNAL MEDICINE

## 2024-07-19 RX ORDER — ALBUTEROL SULFATE 90 UG/1
1-2 AEROSOL, METERED RESPIRATORY (INHALATION) EVERY 4 HOURS PRN
Qty: 18 G | Refills: 3 | Status: SHIPPED | OUTPATIENT
Start: 2024-07-19

## 2024-07-19 RX ORDER — FLUTICASONE PROPIONATE 220 UG/1
AEROSOL, METERED RESPIRATORY (INHALATION)
Qty: 12 G | Refills: 11 | Status: SHIPPED | OUTPATIENT
Start: 2024-07-19

## 2024-07-19 ASSESSMENT — PAIN SCALES - GENERAL: PAINLEVEL: NO PAIN (0)

## 2024-07-19 NOTE — PROGRESS NOTES
"Preventive Care Visit  [facility] RUY Nieves MD, Internal Medicine - Pediatrics  Jul 19, 2024      Assessment & Plan       Mild intermittent asthma with exacerbation  Asthma Control Test well controlled.  Refilled.   - fluticasone (FLOVENT HFA) 220 MCG/ACT inhaler; TAKE 1 PUFF BY MOUTH TWICE A DAY  - albuterol (PROAIR HFA) 108 (90 Base) MCG/ACT inhaler; Inhale 1-2 puffs into the lungs every 4 hours as needed for shortness of breath or wheezing    Morbid obesity (H)  Labs today.  Discussion of ozempic/wegovy upon return and labs.   - Hemoglobin A1c; Future  - Comprehensive metabolic panel (BMP + Alb, Alk Phos, ALT, AST, Total. Bili, TP); Future  - CBC with platelets and differential; Future  - Lipid panel reflex to direct LDL Fasting; Future  - TSH with free T4 reflex; Future  - Hemoglobin A1c  - Comprehensive metabolic panel (BMP + Alb, Alk Phos, ALT, AST, Total. Bili, TP)  - CBC with platelets and differential  - Lipid panel reflex to direct LDL Fasting  - TSH with free T4 reflex    IFG (impaired fasting glucose)  Begin diabetes educator (CDE).   - Hemoglobin A1c; Future  - Comprehensive metabolic panel (BMP + Alb, Alk Phos, ALT, AST, Total. Bili, TP); Future  - CBC with platelets and differential; Future  - Lipid panel reflex to direct LDL Fasting; Future  - TSH with free T4 reflex; Future  - Adult Diabetes Education  Referral; Future  - Hemoglobin A1c  - Comprehensive metabolic panel (BMP + Alb, Alk Phos, ALT, AST, Total. Bili, TP)  - CBC with platelets and differential  - Lipid panel reflex to direct LDL Fasting  - TSH with free T4 reflex    Insomnia, unspecified type  Referred for sleep evaluation.   - Adult Sleep Eval & Management Referral; Future            BMI  Estimated body mass index is 49.76 kg/m  as calculated from the following:    Height as of this encounter: 1.6 m (5' 3\").    Weight as of this encounter: 127.4 kg (280 lb 14.4 oz).   Weight management plan: Discussed " healthy diet and exercise guidelines    Counseling  Appropriate preventive services were addressed with this patient via screening, questionnaire, or discussion as appropriate for fall prevention, nutrition, physical activity, Tobacco-use cessation, weight loss and cognition.  Checklist reviewing preventive services available has been given to the patient.  Reviewed patient's diet, addressing concerns and/or questions.   The patient was instructed to see the dentist every 6 months.           Remy Marte is a 33 year old, presenting for the following:  Recheck Medication (Asthma meds), Physical (Non fasting ), and Consult (Weight management /)        7/19/2024     2:06 PM   Additional Questions   Roomed by MAHAD Branch   Accompanied by n/a         7/19/2024     2:06 PM   Patient Reported Additional Medications   Patient reports taking the following new medications n/a          HPI  Asthma;  FLovent twice daily, and as needed albuterol.  Not needed albuterol since Spring.     To change OCPs soon.  Gets tired on current OCPs.     Sister diagnosed with CKD2.     Weight going up.  275 generally, up from 269 in 2021.                   7/18/2024   General Health   How would you rate your overall physical health? (!) POOR   Feel stress (tense, anxious, or unable to sleep) Rather much      (!) STRESS CONCERN      7/18/2024   Nutrition   Three or more servings of calcium each day? (!) NO   Diet: Regular (no restrictions)   How many servings of fruit and vegetables per day? (!) 2-3   How many sweetened beverages each day? 0-1            6/17/2019   Exercise   Frequency of exercise: 2-3 days/week            7/18/2024   Social Factors   Frequency of gathering with friends or relatives Patient declined   Worry food won't last until get money to buy more No   Food not last or not have enough money for food? No   Do you have housing? (Housing is defined as stable permanent housing and does not include staying ouside in a car,  in a tent, in an abandoned building, in an overnight shelter, or couch-surfing.) Yes   Are you worried about losing your housing? No   Lack of transportation? Patient declined   Unable to get utilities (heat,electricity)? No            7/18/2024   Dental   Dentist two times every year? (!) NO            7/18/2024   TB Screening   Were you born outside of the [place]? No              Today's PHQ-2 Score:       1/23/2024     3:45 PM   PHQ-2 ( 1999 [employer])   Q1: Little interest or pleasure in doing things 0   Q2: Feeling down, depressed or hopeless 0   PHQ-2 Score 0   Q1: Little interest or pleasure in doing things Not at all   Q2: Feeling down, depressed or hopeless Not at all   PHQ-2 Score 0         7/18/2024   Substance Use   Alcohol more than 3/day or more than 7/wk No   Do you use any other substances recreationally? No        Social History     Tobacco Use    Smoking status: Never     Passive exposure: Never    Smokeless tobacco: Never   Vaping Use    Vaping status: Never Used   Substance Use Topics    Alcohol use: Yes    Drug use: Never                    7/18/2024   One time HIV Screening   Previous HIV test? No          7/18/2024   STI Screening   New sexual partner(s) since last STI/HIV test? No              6/17/2019     1:40 PM   PAP / HPV   PAP (Historical) NIL            7/18/2024   Contraception/Family Planning   Questions about contraception or family planning (!) YES            Reviewed and updated as needed this visit by Provider                    Past Medical History:   Diagnosis Date    Mild intermittent asthma without complication      Past Surgical History:   Procedure Laterality Date    implanon  10/17-4/18    removed for weight gain and mood    NO HISTORY OF SURGERY           Review of Systems  Constitutional, HEENT, cardiovascular, pulmonary, GI, , musculoskeletal, neuro, skin, endocrine and psych systems are negative, except as otherwise noted.     Objective    Exam  /81   Pulse 102    "Temp 97.4  F (36.3  C) (Tympanic)   Resp 16   Ht 1.6 m (5' 3\")   Wt 127.4 kg (280 lb 14.4 oz)   LMP 06/15/2024 (Within Days)   SpO2 99%   Breastfeeding No   BMI 49.76 kg/m     Estimated body mass index is 49.76 kg/m  as calculated from the following:    Height as of this encounter: 1.6 m (5' 3\").    Weight as of this encounter: 127.4 kg (280 lb 14.4 oz).    Physical Exam  GENERAL: alert and no distress  EYES: Eyes grossly normal to inspection, PERRL and conjunctivae and sclerae normal  HENT: ear canals and TM's normal, nose and mouth without ulcers or lesions  NECK: no adenopathy, no asymmetry, masses, or scars  RESP: lungs clear to auscultation - no rales, rhonchi or wheezes  CV: regular rate and rhythm, normal S1 S2, no S3 or S4, no murmur, click or rub, no peripheral edema  ABDOMEN: soft, nontender, no hepatosplenomegaly, no masses and bowel sounds normal  MS: no gross musculoskeletal defects noted, no edema  SKIN: no suspicious lesions or rashes  NEURO: Normal strength and tone, mentation intact and speech normal  PSYCH: mentation appears normal, affect normal/bright        Signed Electronically by: Osvaldo Nieves MD    "

## 2024-07-19 NOTE — PATIENT INSTRUCTIONS
Get your Flu and COVID this fall.    Prevnar 20 shot today.    Lab work today:  We can do labs in the exam room today, or you can get them done downstairs in the lab.        Let's get you in with a diabetes educator (CDE).  They will call you.    Let's get you in for a sleep study.  They will call you.     Set up a 1-2 month follow up with me to review labs and discuss Ozempic.     Osvaldo Nieves MD  Internal Medicine and Pediatrics   Patient Education   Preventive Care Advice   This is general advice given by our system to help you stay healthy. However, your care team may have specific advice just for you. Please talk to your care team about your preventive care needs.  Nutrition  Eat 5 or more servings of fruits and vegetables each day.  Try wheat bread, brown rice and whole grain pasta (instead of white bread, rice, and pasta).  Get enough calcium and vitamin D. Check the label on foods and aim for 100% of the RDA (recommended daily allowance).  Lifestyle  Exercise at least 150 minutes each week  (30 minutes a day, 5 days a week).  Do muscle strengthening activities 2 days a week. These help control your weight and prevent disease.  No smoking.  Wear sunscreen to prevent skin cancer.  Have a dental exam and cleaning every 6 months.  Yearly exams  See your health care team every year to talk about:  Any changes in your health.  Any medicines your care team has prescribed.  Preventive care, family planning, and ways to prevent chronic diseases.  Shots (vaccines)   HPV shots (up to age 26), if you've never had them before.  Hepatitis B shots (up to age 59), if you've never had them before.  COVID-19 shot: Get this shot when it's due.  Flu shot: Get a flu shot every year.  Tetanus shot: Get a tetanus shot every 10 years.  Pneumococcal, hepatitis A, and RSV shots: Ask your care team if you need these based on your risk.  Shingles shot (for age 50 and up)  General health tests  Diabetes screening:  Starting at age 35,  Get screened for diabetes at least every 3 years.  If you are younger than age 35, ask your care team if you should be screened for diabetes.  Cholesterol test: At age 39, start having a cholesterol test every 5 years, or more often if advised.  Bone density scan (DEXA): At age 50, ask your care team if you should have this scan for osteoporosis (brittle bones).  Hepatitis C: Get tested at least once in your life.  STIs (sexually transmitted infections)  Before age 24: Ask your care team if you should be screened for STIs.  After age 24: Get screened for STIs if you're at risk. You are at risk for STIs (including HIV) if:  You are sexually active with more than one person.  You don't use condoms every time.  You or a partner was diagnosed with a sexually transmitted infection.  If you are at risk for HIV, ask about PrEP medicine to prevent HIV.  Get tested for HIV at least once in your life, whether you are at risk for HIV or not.  Cancer screening tests  Cervical cancer screening: If you have a cervix, begin getting regular cervical cancer screening tests starting at age 21.  Breast cancer scan (mammogram): If you've ever had breasts, begin having regular mammograms starting at age 40. This is a scan to check for breast cancer.  Colon cancer screening: It is important to start screening for colon cancer at age 45.  Have a colonoscopy test every 10 years (or more often if you're at risk) Or, ask your provider about stool tests like a FIT test every year or Cologuard test every 3 years.  To learn more about your testing options, visit:   .  For help making a decision, visit:   https://bit.ly/bw88398.  Prostate cancer screening test: If you have a prostate, ask your care team if a prostate cancer screening test (PSA) at age 55 is right for you.  Lung cancer screening: If you are a current or former smoker ages 50 to 80, ask your care team if ongoing lung cancer screenings are right for you.  For informational purposes  only. Not to replace the advice of your health care provider. Copyright   2023 [facility]. All rights reserved. Clinically reviewed by the [facility]. Qewz [id number] - REV 01/24.  Learning About Stress  What is stress?     Stress is your body's response to a hard situation. Your body can have a physical, emotional, or mental response. Stress is a fact of life for most people, and it affects everyone differently. What causes stress for you may not be stressful for someone else.  A lot of things can cause stress. You may feel stress when you go on a job interview, take a test, or run a race. This kind of short-term stress is normal and even useful. It can help you if you need to work hard or react quickly. For example, stress can help you finish an important job on time.  Long-term stress is caused by ongoing stressful situations or events. Examples of long-term stress include long-term health problems, ongoing problems at work, or conflicts in your family. Long-term stress can harm your health.  How does stress affect your health?  When you are stressed, your body responds as though you are in danger. It makes hormones that speed up your heart, make you breathe faster, and give you a burst of energy. This is called the fight-or-flight stress response. If the stress is over quickly, your body goes back to normal and no harm is done.  But if stress happens too often or lasts too long, it can have bad effects. Long-term stress can make you more likely to get sick, and it can make symptoms of some diseases worse. If you tense up when you are stressed, you may develop neck, shoulder, or low back pain. Stress is linked to high blood pressure and heart disease.  Stress also harms your emotional health. It can make you nicole, tense, or depressed. Your relationships may suffer, and you may not do well at work or school.  What can you do to manage stress?  You can try these things to  help manage stress:   Do something active. Exercise or activity can help reduce stress. Walking is a great way to get started. Even everyday activities such as housecleaning or yard work can help.  Try yoga or albert chi. These techniques combine exercise and meditation. You may need some training at first to learn them.  Do something you enjoy. For example, listen to music or go to a movie. Practice your hobby or do volunteer work.  Meditate. This can help you relax, because you are not worrying about what happened before or what may happen in the future.  Do guided imagery. Imagine yourself in any setting that helps you feel calm. You can use online videos, books, or a teacher to guide you.  Do breathing exercises. For example:  From a standing position, bend forward from the waist with your knees slightly bent. Let your arms dangle close to the floor.  Breathe in slowly and deeply as you return to a standing position. Roll up slowly and lift your head last.  Hold your breath for just a few seconds in the standing position.  Breathe out slowly and bend forward from the waist.  Let your feelings out. Talk, laugh, cry, and express anger when you need to. Talking with supportive friends or family, a counselor, or a mitul leader about your feelings is a healthy way to relieve stress. Avoid discussing your feelings with people who make you feel worse.  Write. It may help to write about things that are bothering you. This helps you find out how much stress you feel and what is causing it. When you know this, you can find better ways to cope.  What can you do to prevent stress?  You might try some of these things to help prevent stress:  Manage your time. This helps you find time to do the things you want and need to do.  Get enough sleep. Your body recovers from the stresses of the day while you are sleeping.  Get support. Your family, friends, and community can make a difference in how you experience stress.  Limit your  "news feed. Avoid or limit time on social media or news that may make you feel stressed.  Do something active. Exercise or activity can help reduce stress. Walking is a great way to get started.  Where can you learn more?  Go to https://www.[employer].net/patiented  Enter N032 in the search box to learn more about \"Learning About Stress.\"  Current as of: October 24, 2023               Content Version: 14.0    1349-9476 [employer].   Care instructions adapted under license by your healthcare professional. If you have questions about a medical condition or this instruction, always ask your healthcare professional. [employer] disclaims any warranty or liability for your use of this information.         "

## 2024-07-20 LAB
ALBUMIN SERPL BCG-MCNC: 4.1 G/DL (ref 3.5–5.2)
ALP SERPL-CCNC: 114 U/L (ref 40–150)
ALT SERPL W P-5'-P-CCNC: 15 U/L (ref 0–50)
ANION GAP SERPL CALCULATED.3IONS-SCNC: 10 MMOL/L (ref 7–15)
AST SERPL W P-5'-P-CCNC: 12 U/L (ref 0–45)
BILIRUB SERPL-MCNC: 0.2 MG/DL
BUN SERPL-MCNC: 11.8 MG/DL (ref 6–20)
CALCIUM SERPL-MCNC: 9.2 MG/DL (ref 8.8–10.4)
CHLORIDE SERPL-SCNC: 102 MMOL/L (ref 98–107)
CHOLEST SERPL-MCNC: 274 MG/DL
CREAT SERPL-MCNC: 0.98 MG/DL (ref 0.51–0.95)
EGFRCR SERPLBLD CKD-EPI 2021: 77 ML/MIN/1.73M2
FASTING STATUS PATIENT QL REPORTED: ABNORMAL
FASTING STATUS PATIENT QL REPORTED: ABNORMAL
GLUCOSE SERPL-MCNC: 127 MG/DL (ref 70–99)
HCO3 SERPL-SCNC: 27 MMOL/L (ref 22–29)
HDLC SERPL-MCNC: 56 MG/DL
LDLC SERPL CALC-MCNC: 187 MG/DL
NONHDLC SERPL-MCNC: 218 MG/DL
POTASSIUM SERPL-SCNC: 4.1 MMOL/L (ref 3.4–5.3)
PROT SERPL-MCNC: 7.4 G/DL (ref 6.4–8.3)
SODIUM SERPL-SCNC: 139 MMOL/L (ref 135–145)
TRIGL SERPL-MCNC: 156 MG/DL
TSH SERPL DL<=0.005 MIU/L-ACNC: 3.41 UIU/ML (ref 0.3–4.2)

## 2024-07-22 ASSESSMENT — ASTHMA QUESTIONNAIRES: ACT_TOTALSCORE: 23

## 2024-07-23 ENCOUNTER — MYC REFILL (OUTPATIENT)
Dept: OBGYN | Facility: CLINIC | Age: 34
End: 2024-07-23
Payer: COMMERCIAL

## 2024-07-23 DIAGNOSIS — N89.8 VAGINAL DRYNESS: ICD-10-CM

## 2024-07-23 RX ORDER — GLYCERIN/PROPYLENE GLYCOL/WATR
SOLUTION, NON-ORAL VAGINAL DAILY PRN
Qty: 66.5 G | Refills: 4 | OUTPATIENT
Start: 2024-07-23

## 2024-08-01 ENCOUNTER — MYC MEDICAL ADVICE (OUTPATIENT)
Dept: PEDIATRICS | Facility: CLINIC | Age: 34
End: 2024-08-01
Payer: COMMERCIAL

## 2024-08-02 NOTE — TELEPHONE ENCOUNTER
See patient's MyChart message   - Patient states that she has been smelling mildew everywhere   - Patient states that she smelled mildew while at the gym   - Patient wondering if this could be a side effect of her diabetes or a sinus infection     Sent a MyChart message to the patient   - Asked the patient questions to gather additional information   - Awaiting a response from the patient at this time     Harriet JONES RN   [facility]

## 2024-08-05 ENCOUNTER — MYC MEDICAL ADVICE (OUTPATIENT)
Dept: PEDIATRICS | Facility: CLINIC | Age: 34
End: 2024-08-05
Payer: COMMERCIAL

## 2024-08-08 ENCOUNTER — NURSE TRIAGE (OUTPATIENT)
Dept: PEDIATRICS | Facility: CLINIC | Age: 34
End: 2024-08-08
Payer: COMMERCIAL

## 2024-08-08 NOTE — TELEPHONE ENCOUNTER
S-(situation): diarrhea    B-(background): present for over a week. Covid negative    A-(assessment): has mushy brown diarrhea 3 times per day after eating. Frequency has been improving, but still occurring. States has cramps before bowel movement, relieved once patient has bowel movement. Has tried pepto bismol but unsure she should be taking with diabetes diagnosis.    Denies blood in diarrhea, fever, weakness, severe pain.    Endorses mildew smell for 1 week.    R-(recommendations): Per protocol, patient should be seen within 3 days. Scheduled with Samuel Tobias 8/9 at 2:30pm with 2:10 arrival time. Instructed pt to call back if new or worsening symptoms. Reviewed care advice under care tab with patient. Patient was given an opportunity to ask questions, verbalized understanding of plan, and is agreeable.      Sonya JAMISON RN on 8/8/2024 at 4:23 PM                 Reason for Disposition   MILD diarrhea (e.g., 1-3 or more stools than normal in past 24 hours) diarrhea without known cause and present > 7 days    Additional Information   Negative: Shock suspected (e.g., cold/pale/clammy skin, too weak to stand, low BP, rapid pulse)   Negative: Difficult to awaken or acting confused (e.g., disoriented, slurred speech)   Negative: Sounds like a life-threatening emergency to the triager   Negative: Vomiting also present and worse than the diarrhea   Negative: Blood in stool and without diarrhea   Negative: SEVERE abdominal pain (e.g., excruciating) and present > 1 hour   Negative: SEVERE abdominal pain and age > 60 years   Negative: Bloody, black, or tarry bowel movements  (Exception: Chronic-unchanged black-grey bowel movements and is taking iron pills or Pepto-Bismol.)   Negative: SEVERE diarrhea (e.g., 7 or more times / day more than normal) and age > 60 years   Negative: Constant abdominal pain lasting > 2 hours   Negative: Drinking very little and dehydration suspected (e.g., no urine > 12 hours, very dry mouth,  "very lightheaded)   Negative: Patient sounds very sick or weak to the triager   Negative: SEVERE diarrhea (e.g., 7 or more times / day more than normal) and present > 24 hours (1 day)   Negative: MODERATE diarrhea (e.g., 4-6 times / day more than normal) and present > 48 hours (2 days)   Negative: MODERATE diarrhea (e.g., 4-6 times / day more than normal) and age > 70 years   Negative: Abdominal pain (Exception: Pain clears completely with each passage of diarrhea stool.)   Negative: Fever > 101 F (38.3 C)   Negative: Blood in the stool  (Exception: Only on toilet paper. Reason: Diarrhea can cause rectal irritation with blood on wiping.)   Negative: Mucus or pus in stool has been present > 2 days and diarrhea is more than mild   Negative: Weak immune system (e.g., HIV positive, cancer chemo, splenectomy, organ transplant, chronic steroids)   Negative: Travel to a foreign country in past month   Negative: Recent antibiotic therapy (i.e., within last 2 months) and diarrhea present > 3 days since antibiotic was stopped   Negative: Recent hospitalization and diarrhea present > 3 days   Negative: Tube feedings (e.g., nasogastric, g-tube, j-tube)    Answer Assessment - Initial Assessment Questions  1. DIARRHEA SEVERITY: \"How bad is the diarrhea?\" \"How many more stools have you had in the past 24 hours than normal?\"     - NO DIARRHEA (SCALE 0)    - MILD (SCALE 1-3): Few loose or mushy BMs; increase of 1-3 stools over normal daily number of stools; mild increase in ostomy output.    -  MODERATE (SCALE 4-7): Increase of 4-6 stools daily over normal; moderate increase in ostomy output.    -  SEVERE (SCALE 8-10; OR \"WORST POSSIBLE\"): Increase of 7 or more stools daily over normal; moderate increase in ostomy output; incontinence.      3 times per day; getting better  2. ONSET: \"When did the diarrhea begin?\"       Over a week ago - friday  3. BM CONSISTENCY: \"How loose or watery is the diarrhea?\"       mushy  4. VOMITING: \"Are " "you also vomiting?\" If Yes, ask: \"How many times in the past 24 hours?\"       No, no nausea  5. ABDOMEN PAIN: \"Are you having any abdomen pain?\" If Yes, ask: \"What does it feel like?\" (e.g., crampy, dull, intermittent, constant)       Cramping resolves once has bowel movement  6. ABDOMEN PAIN SEVERITY: If present, ask: \"How bad is the pain?\"  (e.g., Scale 1-10; mild, moderate, or severe)    - MILD (1-3): doesn't interfere with normal activities, abdomen soft and not tender to touch     - MODERATE (4-7): interferes with normal activities or awakens from sleep, abdomen tender to touch     - SEVERE (8-10): excruciating pain, doubled over, unable to do any normal activities        7-8/10  7. ORAL INTAKE: If vomiting, \"Have you been able to drink liquids?\" \"How much liquids have you had in the past 24 hours?\"      Able to eat and hydrate  8. HYDRATION: \"Any signs of dehydration?\" (e.g., dry mouth [not just dry lips], too weak to stand, dizziness, new weight loss) \"When did you last urinate?\"      Have chronic fatigue, diabetes. Not noticing that anything has worsened  9. EXPOSURE: \"Have you traveled to a foreign country recently?\" \"Have you been exposed to anyone with diarrhea?\" \"Could you have eaten any food that was spoiled?\"      No- does eat out  10. ANTIBIOTIC USE: \"Are you taking antibiotics now or have you taken antibiotics in the past 2 months?\"        no  11. OTHER SYMPTOMS: \"Do you have any other symptoms?\" (e.g., fever, blood in stool)        No blood in stool, no fevers  12. PREGNANCY: \"Is there any chance you are pregnant?\" \"When was your last menstrual period?\"        no    Protocols used: Diarrhea-A-OH    "

## 2024-08-08 NOTE — TELEPHONE ENCOUNTER
Please see telephone encounter with today's date for triage  Sonya JAMISON RN on 8/8/2024 at 4:24 PM

## 2024-09-12 ENCOUNTER — OFFICE VISIT (OUTPATIENT)
Dept: OBGYN | Facility: CLINIC | Age: 34
End: 2024-09-12
Payer: COMMERCIAL

## 2024-09-12 VITALS — DIASTOLIC BLOOD PRESSURE: 60 MMHG | BODY MASS INDEX: 48.18 KG/M2 | SYSTOLIC BLOOD PRESSURE: 116 MMHG | WEIGHT: 272 LBS

## 2024-09-12 DIAGNOSIS — Z30.09 BIRTH CONTROL COUNSELING: ICD-10-CM

## 2024-09-12 DIAGNOSIS — Z30.430 ENCOUNTER FOR IUD INSERTION: Primary | ICD-10-CM

## 2024-09-12 PROCEDURE — 99213 OFFICE O/P EST LOW 20 MIN: CPT | Performed by: OBSTETRICS & GYNECOLOGY

## 2024-09-12 RX ORDER — MISOPROSTOL 200 UG/1
400 TABLET ORAL ONCE
Qty: 2 TABLET | Refills: 0 | Status: SHIPPED | OUTPATIENT
Start: 2024-09-12 | End: 2024-09-12

## 2024-09-12 NOTE — NURSING NOTE
"Chief Complaint   Patient presents with    Contraception     IUD consult        Initial /60   Wt 123.4 kg (272 lb)   LMP 2024 (Within Weeks)   BMI 48.18 kg/m   Estimated body mass index is 48.18 kg/m  as calculated from the following:    Height as of 24: 1.6 m (5' 3\").    Weight as of this encounter: 123.4 kg (272 lb).  BP completed using cuff size: large    Questioned patient about current smoking habits.  Pt. has never smoked.          The following HM Due: pap      "

## 2024-09-12 NOTE — PROGRESS NOTES
SUBJECTIVE:                                                     Rosanna Leung is a 33 year old female  who presents today for discussion of birth control options.    2017 had Nexplanon, gained a lot of weight and had it removed after 7 months.  Irregular periods, took oral contraceptive pills but doesn't always remember.  When she is completely off the pill, periods are normal. But she has been off and on the pill for a while, and so she doesn't really know what things would be like if off it long term.    She is most interested in an IUD, but would like to discuss the options.  She thought she might be interested in a copper IUD, but as we discussed things, she is now leaning towards a levonorgestrel IUD.  In addition, we discussed placing this today or coming back for it after prepping the cervix with Cytotec, using ibuprofen, and using intra uterine lidocaine.  She prefers to come back on a different day for this.  She also will need a Pap smear at that time.      Problem list and histories reviewed & adjusted, as indicated.  Additional history: as documented.    Patient Active Problem List   Diagnosis    Morbid obesity (H)    Mild intermittent asthma without complication    Migraine without aura and without status migrainosus, not intractable    IFG (impaired fasting glucose)     Past Surgical History:   Procedure Laterality Date    implanon  10/17-    removed for weight gain and mood    NO HISTORY OF SURGERY        Social History     Tobacco Use    Smoking status: Never     Passive exposure: Never    Smokeless tobacco: Never   Substance Use Topics    Alcohol use: Yes      Problem (# of Occurrences) Relation (Name,Age of Onset)    Asthma (1) Brother (Vijay)    Diabetes (2) Mother (Kimberly), Brother (Vijay)    Clotting Disorder (3) Mother (Kimberly), Maternal Aunt (Rosaura): DVT ?after surgery, Maternal Aunt (Suzy): after MVA    Pulmonary Embolism (1) Mother (Kimberly): after surgery    Sickle Cell Anemia  (2) Cousin, Maternal Aunt (Rosaura)              Current Outpatient Medications   Medication Sig Dispense Refill    albuterol (PROAIR HFA) 108 (90 Base) MCG/ACT inhaler Inhale 1-2 puffs into the lungs every 4 hours as needed for shortness of breath or wheezing 18 g 3    fluticasone (FLOVENT HFA) 220 MCG/ACT inhaler TAKE 1 PUFF BY MOUTH TWICE A DAY 12 g 11    levonorgestrel-ethinyl estradiol (NORDETTE) 0.15-30 MG-MCG tablet Take 1 tablet by mouth daily 84 tablet 4    misoprostol (CYTOTEC) 200 MCG tablet Place 2 tablets (400 mcg) vaginally once for 1 dose. Two tabs pv the evening prior to procedure. 2 tablet 0    Lubricants (ASTROGLIDE) GEL Externally apply topically daily as needed (need for lubricant) 66.5 g 4     No current facility-administered medications for this visit.     No Known Allergies    ROS:  Negative other than as noted in HPI.    OBJECTIVE:     Exam:  Constitutional:  Appearance: Well nourished, well developed alert, in no acute distress  Neurologic/Psychiatric:  Mental Status:  Oriented X3       In-Clinic Test Results:  No results found for this or any previous visit (from the past 24 hour(s)).    ASSESSMENT/PLAN:                                                        ICD-10-CM    1. Encounter for IUD insertion  Z30.430 misoprostol (CYTOTEC) 200 MCG tablet      2. Birth control counseling  Z30.09             -We reviewed all options for birth control, and she is most interested in discontinuing her oral contraceptives and having placement of a Kyleena IUD.  Risk, benefits, and alternatives were discussed.  We discussed the insertion procedure.  She is interested in using Cytotec for cervical preparation, and this was discussed.  Prescription was sent.  She will take ibuprofen 600 to 800 mg 1 hour prior to the procedure.  We will use intrauterine lidocaine when sounding the uterus.  We can perform a Pap smear at the same time.  She has no further questions.    -She can continue her birth control pill  until 1 week after the IUD is inserted if she would like.  We discussed that no further restrictions are needed after the placement of the IUD.    Yvette Spaulding MD  [facility]

## 2024-09-20 ENCOUNTER — TELEPHONE (OUTPATIENT)
Dept: MIDWIFE SERVICES | Facility: CLINIC | Age: 34
End: 2024-09-20
Payer: COMMERCIAL

## 2024-09-20 DIAGNOSIS — N93.8 DUB (DYSFUNCTIONAL UTERINE BLEEDING): ICD-10-CM

## 2024-09-20 RX ORDER — LEVONORGESTREL AND ETHINYL ESTRADIOL 0.15-0.03
1 KIT ORAL DAILY
Qty: 84 TABLET | Refills: 1 | Status: SHIPPED | OUTPATIENT
Start: 2024-09-20

## 2024-09-20 NOTE — TELEPHONE ENCOUNTER
[facility] Call Center    Phone Message    May a detailed message be left on voicemail: yes     Reason for Call: Medication Refill Request    Has the patient contacted the pharmacy for the refill? Yes   Name of medication being requested: levonorgestrel-ethinyl estradiol (NORDETTE) 0.15-30 MG-MCG tablet  Provider who prescribed the medication: Vandana Douglas  Pharmacy: [place] [address]  Date medication is needed: asap- pt wants it sent to the above pharmacy, she is out of pills, please call pt once sent, thank you       Action Taken: Message routed to:  Other: obgyn    Travel Screening: Not Applicable     Date of Service:

## 2024-09-26 ENCOUNTER — OFFICE VISIT (OUTPATIENT)
Dept: PEDIATRICS | Facility: CLINIC | Age: 34
End: 2024-09-26
Payer: COMMERCIAL

## 2024-09-26 VITALS
OXYGEN SATURATION: 100 % | HEIGHT: 63 IN | WEIGHT: 276.7 LBS | BODY MASS INDEX: 49.03 KG/M2 | TEMPERATURE: 98.8 F | HEART RATE: 90 BPM | SYSTOLIC BLOOD PRESSURE: 110 MMHG | RESPIRATION RATE: 18 BRPM | DIASTOLIC BLOOD PRESSURE: 60 MMHG

## 2024-09-26 DIAGNOSIS — E11.9 TYPE 2 DIABETES MELLITUS WITHOUT COMPLICATION, WITHOUT LONG-TERM CURRENT USE OF INSULIN (H): Primary | ICD-10-CM

## 2024-09-26 DIAGNOSIS — E66.01 MORBID OBESITY (H): ICD-10-CM

## 2024-09-26 PROBLEM — R73.01 IFG (IMPAIRED FASTING GLUCOSE): Status: RESOLVED | Noted: 2024-07-19 | Resolved: 2024-09-26

## 2024-09-26 LAB
CREAT UR-MCNC: 53.3 MG/DL
MICROALBUMIN UR-MCNC: <12 MG/L
MICROALBUMIN/CREAT UR: NORMAL MG/G{CREAT}

## 2024-09-26 PROCEDURE — 99207 PR FOOT EXAM NO CHARGE: CPT | Performed by: INTERNAL MEDICINE

## 2024-09-26 PROCEDURE — 90480 ADMN SARSCOV2 VAC 1/ONLY CMP: CPT | Performed by: INTERNAL MEDICINE

## 2024-09-26 PROCEDURE — 82043 UR ALBUMIN QUANTITATIVE: CPT | Performed by: INTERNAL MEDICINE

## 2024-09-26 PROCEDURE — 82570 ASSAY OF URINE CREATININE: CPT | Performed by: INTERNAL MEDICINE

## 2024-09-26 PROCEDURE — 99214 OFFICE O/P EST MOD 30 MIN: CPT | Mod: 25 | Performed by: INTERNAL MEDICINE

## 2024-09-26 PROCEDURE — 90471 IMMUNIZATION ADMIN: CPT | Performed by: INTERNAL MEDICINE

## 2024-09-26 PROCEDURE — 91320 SARSCV2 VAC 30MCG TRS-SUC IM: CPT | Performed by: INTERNAL MEDICINE

## 2024-09-26 PROCEDURE — 90656 IIV3 VACC NO PRSV 0.5 ML IM: CPT | Performed by: INTERNAL MEDICINE

## 2024-09-26 RX ORDER — ATORVASTATIN CALCIUM 20 MG/1
20 TABLET, FILM COATED ORAL DAILY
Qty: 90 TABLET | Refills: 3 | Status: SHIPPED | OUTPATIENT
Start: 2024-09-26

## 2024-09-26 RX ORDER — ASPIRIN 81 MG/1
81 TABLET ORAL DAILY
Qty: 90 TABLET | Refills: 3 | Status: SHIPPED | OUTPATIENT
Start: 2024-09-26

## 2024-09-26 ASSESSMENT — PAIN SCALES - GENERAL: PAINLEVEL: NO PAIN (0)

## 2024-09-26 NOTE — PATIENT INSTRUCTIONS
Lab work today:  We can do labs in the exam room today, or you can get them done downstairs in the lab.        FLU and COVID shots today.    Begin aspirin 81 mg and atorvastatin 20 mg daily.    This weekend, begin ozempic 0.25 for 4 weeks, then 0.5, then 1.0.    Follow up in 3 months.    Osvaldo Nieves MD  Internal Medicine and Pediatrics

## 2024-10-07 ENCOUNTER — VIRTUAL VISIT (OUTPATIENT)
Dept: EDUCATION SERVICES | Facility: CLINIC | Age: 34
End: 2024-10-07
Attending: DIETITIAN, REGISTERED
Payer: COMMERCIAL

## 2024-10-07 DIAGNOSIS — E11.9 DIABETES MELLITUS (H): Primary | ICD-10-CM

## 2024-10-07 PROCEDURE — G0108 DIAB MANAGE TRN  PER INDIV: HCPCS | Mod: 93 | Performed by: DIETITIAN, REGISTERED

## 2024-10-07 NOTE — PATIENT INSTRUCTIONS
Dear Rosanna,     It was a pleasure visiting with you today. Here is a summary of our visit today:    Goals for Diabetes Care:    Check blood sugars at least 1-2 times daily at varying times: fasting/before meal and/or 2 hours after a meal        Blood Glucose Targets:        -Fasting and before meal target is around 100        -2 hours after a meal target is < 140        Always remember to bring meter and/or log book to all appointments.     2.  Eat 3 balanced meals each day - Do NOT skip/delay meals - plan ahead.  - Use plate method/aim to eat a balanced plate - half your plate fruits/veggies, 1/4 the plate protein and 1/4 plate starch (rice, potato, pasta)  - Switch to healthier carbs (whole grains, legumes/beans, fruit, dairy)  - Keep kitchen/pantry well stocked with healthy snack options like nuts, veggies, fruit, cottage cheese, trail mix etc  - Drink more water.  - Do not wait longer than 4-5 hours to eat something  - Make sure you include protein source with each meal and at bedtime - this has been shown to help with blood glucose elevations     3. Activity really helps improve blood sugars.Try to Incorporate 30 minutes activity into each day - does not need to be all at one time & walking counts!     4. Take diabetes medications as prescribed.  Keep taking your diabetes medications as directed on the bottle and call for a refill when your pills/injectables are getting low.     Call/mychart with any questions.    Thank you!  Meghann Nice RDN, LD, [facility]   Certified Diabetes Care &   Triage [phone]

## 2024-10-07 NOTE — LETTER
10/7/2024         RE: Rosanna Leung  [address]  [place] [address]        Dear Colleague,    Thank you for referring your patient, Rosanna Leung, to the [facility]. Please see a copy of my visit note below.      Diabetes Self-Management Education & Support    Presents for: Initial Assessment for new diagnosis    Type of Service: Telephone Visit      ASSESSMENT:  Initial visit for DSME  New dx  Fam hx: mom, brother, sister  Reports occasional tingling of hands and feet    Currently taking 1 mg weekly ozempic - sporadic diarrhea  Reports decreased appetite and ~ 8 lb wt loss in the past ~ 2 months     2 meals daily  Food recall:  BF/L: turkey meat balls, noodles  D: chicken, green beans  Snacks: cookies   Has quit drinking soda. Does water (sparkling water)    Stays active around the house. Goes to group fitness classes 2x/wk     Education/Discussion: Reviewed diabetes basics, AIC and BS goals, sx and tx of hypo and hyperglycemia, complications of uncontrolled diabetes, general activity and diet guidelines, CHO foods, concept of budgeting and balancing, planning ahead for healthy meals, examples of quick and healthy meals/snacks, plate method for portion control, meter and testing basics: pt expressed understanding.    Discussed optimal lifestyle interventions to help control blood sugars, including healthy diet and physical activity. Advised to eat a diet low in carbohydrates with increased vegetables, healthy fat, and protein.    Encouraged to not wait longer than 4-6 hours to eat meals or snacks and discussed how eating regularly helps avoid lows and keeps the liver from putting out glucose on its own, making the blood sugar higher.        Patient's most recent   Lab Results   Component Value Date    A1C 6.7 07/19/2024    A1C 6.0 06/17/2019     is meeting goal of <7.0    PLAN    Continue with current therapy  Topics to cover at upcoming visits: Problem Solving and  "Reducing Risks    Follow-up: pt wants to call later to schedule a follow up    See Care Plan for co-developed, patient-state behavior change goals.  AVS provided for patient today.    Education Materials Provided:  Living Healthy with Diabetes and My Plate Planner, CHO foods serving sizes      SUBJECTIVE/OBJECTIVE:  Presents for: Initial Assessment for new diagnosis  Accompanied by: Self  Diabetes education in the past 24mo: No  Focus of Visit: Assistance w/ making life changes, Self-care behavioral goal setting  Diabetes type: Type 2  Cultural Influences/Ethnic Background:  Not  or     Diabetes Symptoms & Complications:  Diabetes Related Symptoms: Neuropathy, Fatigue  Weight trend: Decreasing     Patient Problem List and Family Medical History reviewed for relevant medical history, current medical status, and diabetes risk factors.    Vitals:  LMP 07/01/2024 (Within Weeks)   Estimated body mass index is 49.03 kg/m  as calculated from the following:    Height as of 9/26/24: 1.6 m (5' 2.99\").    Weight as of 9/26/24: 125.5 kg (276 lb 11.2 oz).   Last 3 BP:   BP Readings from Last 3 Encounters:   09/26/24 110/60   09/12/24 116/60   07/19/24 123/81       History   Smoking Status     Never   Smokeless Tobacco     Never       Labs:  Lab Results   Component Value Date    A1C 6.7 07/19/2024    A1C 6.0 06/17/2019     Lab Results   Component Value Date     07/19/2024    GLC 88 04/09/2019     Lab Results   Component Value Date     07/19/2024     06/17/2019     HDL Cholesterol   Date Value Ref Range Status   06/17/2019 72 >49 mg/dL Final     Direct Measure HDL   Date Value Ref Range Status   07/19/2024 56 >=50 mg/dL Final   ]  GFR Estimate   Date Value Ref Range Status   07/19/2024 77 >60 mL/min/1.73m2 Final     Comment:     eGFR calculated using 2021 CKD-EPI equation.   04/09/2019 >90 >60 mL/min/[1.73_m2] Final     Comment:     Non  GFR Calc  Starting 12/18/2018, serum " "creatinine based estimated GFR (eGFR) will be   calculated using the Chronic Kidney Disease Epidemiology Collaboration   (CKD-EPI) equation.       GFR Estimate If Black   Date Value Ref Range Status   04/09/2019 >90 >60 mL/min/[1.73_m2] Final     Comment:      GFR Calc  Starting 12/18/2018, serum creatinine based estimated GFR (eGFR) will be   calculated using the Chronic Kidney Disease Epidemiology Collaboration   (CKD-EPI) equation.       Lab Results   Component Value Date    CR 0.98 07/19/2024    CR 0.83 04/09/2019     No results found for: \"MICROALBUMIN\"    Healthy Eating:  Cultural/Baptism diet restrictions?: Yes  Meal planning/habits: Smaller portions  Who cooks/prepares meals for you?: Self  Who purchases food in  your home?: Self  Meals include: Dinner, Lunch  Lunch: turkey meat balls, egg noodles  Dinner: chicken breast, green beans  Snacks: cookies  Beverages: Water, Other (see Comments)    Being Active:  Being Active Assessed Today: Yes    Monitoring:     Taking Medications:  Diabetes Medication(s)       Incretin Mimetic Agents       semaglutide (OZEMPIC) 2 MG/3ML pen Inject 0.25 mg subcutaneously every 7 days.     semaglutide (OZEMPIC) 2 MG/3ML pen Inject 0.5 mg subcutaneously every 7 days.     Semaglutide, 1 MG/DOSE, (OZEMPIC) 4 MG/3ML pen Inject 1 mg subcutaneously every 7 days.            Taking Medication Assessed Today: Yes  Current Treatments: Diet, Non-insulin Injectables    Problem Solving:  Is the patient at risk for hypoglycemia?: No    Healthy Coping:  Informal Support system:: Family  Stage of change: PREPARATION (Decided to change - considering how)  Patient Activation Measure Survey Score:      12/13/2021     2:03 PM   FRANCINE Score (Last Two)   FRANCINE Raw Score 30   Activation Score 56   FRANCINE Level 3       Time Spent: 60 minutes  Encounter Type: Individual    Any diabetes medication dose changes were made via the CDE Protocol per the patient's referring provider. A copy of this " encounter was shared with the provider.

## 2024-10-07 NOTE — PROGRESS NOTES
Diabetes Self-Management Education & Support    Presents for: Initial Assessment for new diagnosis    Type of Service: Telephone Visit      ASSESSMENT:  Initial visit for DSME  New dx  Fam hx: mom, brother, sister  Reports occasional tingling of hands and feet    Currently taking 1 mg weekly ozempic - sporadic diarrhea  Reports decreased appetite and ~ 8 lb wt loss in the past ~ 2 months     2 meals daily  Food recall:  BF/L: turkey meat balls, noodles  D: chicken, green beans  Snacks: cookies   Has quit drinking soda. Does water (sparkling water)    Stays active around the house. Goes to group fitness classes 2x/wk     Education/Discussion: Reviewed diabetes basics, AIC and BS goals, sx and tx of hypo and hyperglycemia, complications of uncontrolled diabetes, general activity and diet guidelines, CHO foods, concept of budgeting and balancing, planning ahead for healthy meals, examples of quick and healthy meals/snacks, plate method for portion control, meter and testing basics: pt expressed understanding.    Discussed optimal lifestyle interventions to help control blood sugars, including healthy diet and physical activity. Advised to eat a diet low in carbohydrates with increased vegetables, healthy fat, and protein.    Encouraged to not wait longer than 4-6 hours to eat meals or snacks and discussed how eating regularly helps avoid lows and keeps the liver from putting out glucose on its own, making the blood sugar higher.        Patient's most recent   Lab Results   Component Value Date    A1C 6.7 07/19/2024    A1C 6.0 06/17/2019     is meeting goal of <7.0    PLAN    Continue with current therapy  Topics to cover at upcoming visits: Problem Solving and Reducing Risks    Follow-up: pt wants to call later to schedule a follow up    See Care Plan for co-developed, patient-state behavior change goals.  AVS provided for patient today.    Education Materials Provided:  Living Healthy with Diabetes and My Plate  "Planner, CHO foods serving sizes      SUBJECTIVE/OBJECTIVE:  Presents for: Initial Assessment for new diagnosis  Accompanied by: Self  Diabetes education in the past 24mo: No  Focus of Visit: Assistance w/ making life changes, Self-care behavioral goal setting  Diabetes type: Type 2  Cultural Influences/Ethnic Background:  Not  or     Diabetes Symptoms & Complications:  Diabetes Related Symptoms: Neuropathy, Fatigue  Weight trend: Decreasing     Patient Problem List and Family Medical History reviewed for relevant medical history, current medical status, and diabetes risk factors.    Vitals:  LMP 07/01/2024 (Within Weeks)   Estimated body mass index is 49.03 kg/m  as calculated from the following:    Height as of 9/26/24: 1.6 m (5' 2.99\").    Weight as of 9/26/24: 125.5 kg (276 lb 11.2 oz).   Last 3 BP:   BP Readings from Last 3 Encounters:   09/26/24 110/60   09/12/24 116/60   07/19/24 123/81       History   Smoking Status    Never   Smokeless Tobacco    Never       Labs:  Lab Results   Component Value Date    A1C 6.7 07/19/2024    A1C 6.0 06/17/2019     Lab Results   Component Value Date     07/19/2024    GLC 88 04/09/2019     Lab Results   Component Value Date     07/19/2024     06/17/2019     HDL Cholesterol   Date Value Ref Range Status   06/17/2019 72 >49 mg/dL Final     Direct Measure HDL   Date Value Ref Range Status   07/19/2024 56 >=50 mg/dL Final   ]  GFR Estimate   Date Value Ref Range Status   07/19/2024 77 >60 mL/min/1.73m2 Final     Comment:     eGFR calculated using 2021 CKD-EPI equation.   04/09/2019 >90 >60 mL/min/[1.73_m2] Final     Comment:     Non  GFR Calc  Starting 12/18/2018, serum creatinine based estimated GFR (eGFR) will be   calculated using the Chronic Kidney Disease Epidemiology Collaboration   (CKD-EPI) equation.       GFR Estimate If Black   Date Value Ref Range Status   04/09/2019 >90 >60 mL/min/[1.73_m2] Final     Comment:     " " GFR Calc  Starting 12/18/2018, serum creatinine based estimated GFR (eGFR) will be   calculated using the Chronic Kidney Disease Epidemiology Collaboration   (CKD-EPI) equation.       Lab Results   Component Value Date    CR 0.98 07/19/2024    CR 0.83 04/09/2019     No results found for: \"MICROALBUMIN\"    Healthy Eating:  Cultural/Orthodoxy diet restrictions?: Yes  Meal planning/habits: Smaller portions  Who cooks/prepares meals for you?: Self  Who purchases food in  your home?: Self  Meals include: Dinner, Lunch  Lunch: turkey meat balls, egg noodles  Dinner: chicken breast, green beans  Snacks: cookies  Beverages: Water, Other (see Comments)    Being Active:  Being Active Assessed Today: Yes    Monitoring:     Taking Medications:  Diabetes Medication(s)       Incretin Mimetic Agents       semaglutide (OZEMPIC) 2 MG/3ML pen Inject 0.25 mg subcutaneously every 7 days.     semaglutide (OZEMPIC) 2 MG/3ML pen Inject 0.5 mg subcutaneously every 7 days.     Semaglutide, 1 MG/DOSE, (OZEMPIC) 4 MG/3ML pen Inject 1 mg subcutaneously every 7 days.            Taking Medication Assessed Today: Yes  Current Treatments: Diet, Non-insulin Injectables    Problem Solving:  Is the patient at risk for hypoglycemia?: No    Healthy Coping:  Informal Support system:: Family  Stage of change: PREPARATION (Decided to change - considering how)  Patient Activation Measure Survey Score:      12/13/2021     2:03 PM   FRANCINE Score (Last Two)   FRANCINE Raw Score 30   Activation Score 56   FRANCINE Level 3       Time Spent: 60 minutes  Encounter Type: Individual    Any diabetes medication dose changes were made via the CDE Protocol per the patient's referring provider. A copy of this encounter was shared with the provider.   "

## 2024-10-18 ENCOUNTER — OFFICE VISIT (OUTPATIENT)
Dept: PEDIATRICS | Facility: CLINIC | Age: 34
End: 2024-10-18
Attending: INTERNAL MEDICINE
Payer: COMMERCIAL

## 2024-10-18 VITALS
SYSTOLIC BLOOD PRESSURE: 111 MMHG | TEMPERATURE: 97.9 F | BODY MASS INDEX: 47.66 KG/M2 | WEIGHT: 269 LBS | DIASTOLIC BLOOD PRESSURE: 77 MMHG | OXYGEN SATURATION: 100 % | HEART RATE: 75 BPM

## 2024-10-18 DIAGNOSIS — E11.9 TYPE 2 DIABETES MELLITUS WITHOUT COMPLICATION, WITHOUT LONG-TERM CURRENT USE OF INSULIN (H): Primary | ICD-10-CM

## 2024-10-18 DIAGNOSIS — M72.2 PLANTAR FASCIITIS OF LEFT FOOT: ICD-10-CM

## 2024-10-18 DIAGNOSIS — G89.29 CHRONIC PAIN OF RIGHT KNEE: ICD-10-CM

## 2024-10-18 DIAGNOSIS — M25.561 CHRONIC PAIN OF RIGHT KNEE: ICD-10-CM

## 2024-10-18 DIAGNOSIS — E66.01 MORBID OBESITY (H): ICD-10-CM

## 2024-10-18 LAB
EST. AVERAGE GLUCOSE BLD GHB EST-MCNC: 128 MG/DL
HBA1C MFR BLD: 6.1 % (ref 0–5.6)

## 2024-10-18 PROCEDURE — G2211 COMPLEX E/M VISIT ADD ON: HCPCS | Performed by: INTERNAL MEDICINE

## 2024-10-18 PROCEDURE — 83036 HEMOGLOBIN GLYCOSYLATED A1C: CPT | Performed by: INTERNAL MEDICINE

## 2024-10-18 PROCEDURE — 36415 COLL VENOUS BLD VENIPUNCTURE: CPT | Performed by: INTERNAL MEDICINE

## 2024-10-18 PROCEDURE — 99214 OFFICE O/P EST MOD 30 MIN: CPT | Performed by: INTERNAL MEDICINE

## 2024-10-18 RX ORDER — MISOPROSTOL 200 UG/1
TABLET ORAL
COMMUNITY
Start: 2024-09-20 | End: 2024-10-18

## 2024-10-18 RX ORDER — BLOOD-GLUCOSE METER
EACH MISCELLANEOUS 2 TIMES DAILY
COMMUNITY
Start: 2024-09-26

## 2024-10-18 ASSESSMENT — PAIN SCALES - GENERAL: PAINLEVEL: NO PAIN (0)

## 2024-10-18 NOTE — PROGRESS NOTES
Assessment & Plan     Type 2 diabetes mellitus without complication, without long-term current use of insulin (H)  Tolerating ozempic well.  Refilled.   - Hemoglobin A1c; Future  - Semaglutide, 2 MG/DOSE, (OZEMPIC) 8 MG/3ML pen; Inject 2 mg subcutaneously every 7 days.  - Hemoglobin A1c  - Lipid panel reflex to direct LDL Fasting; Future    Morbid obesity (H)  Successful ongoing weight loss.     Chronic pain of right knee  Likely from weight issues as well as favoring from her plantar fasciitis.  Begin physical therapy for both.   - Physical Therapy  Referral; Future    Plantar fasciitis of left foot  Ice and stretches and nonsteroidals (like ibuprofen or aspirin or naproxen) taught.   - Physical Therapy  Referral; Future              See Patient Instructions    Remy Marte is a 33 year old, presenting for the following health issues:  RECHECK      10/18/2024     1:38 PM   Additional Questions   Roomed by Aurora Martinez CMA   Accompanied by N/A         10/18/2024     1:38 PM   Patient Reported Additional Medications   Patient reports taking the following new medications N/A     History of Present Illness       Diabetes:   She presents for follow up of diabetes.    She is not checking blood glucose.        She is concerned about blood sugar frequently over 200.    She is not experiencing numbness or burning in feet, excessive thirst, blurry vision, weight changes or redness, sores or blisters on feet. The patient has not had a diabetic eye exam in the last 12 months.            Weight loss on ozempic 7 pounds in about 3-4 weeks.  No significant side effects.  Does note that after injection, feels some pain at the injection site.    Changed to other side of abdomen.  No nausea, vomiting, diarrhea, or constipation.  Notes that does not eat as much.     Some pain behind right let, popliteal area, after walking.  Left foot plantar fasciitis, causig her to favor right leg.               Review  of Systems  Constitutional, HEENT, cardiovascular, pulmonary, GI, , musculoskeletal, neuro, skin, endocrine and psych systems are negative, except as otherwise noted.      Objective    LMP 07/01/2024 (Within Weeks)   There is no height or weight on file to calculate BMI.  Physical Exam   GENERAL: alert and no distress  EYES: Eyes grossly normal to inspection, PERRL and conjunctivae and sclerae normal  HENT: ear canals and TM's normal, nose and mouth without ulcers or lesions  NECK: no adenopathy, no asymmetry, masses, or scars  RESP: lungs clear to auscultation - no rales, rhonchi or wheezes  CV: regular rate and rhythm, normal S1 S2, no S3 or S4, no murmur, click or rub, no peripheral edema  ABDOMEN: soft, nontender, no hepatosplenomegaly, no masses and bowel sounds normal  MS: no gross musculoskeletal defects noted, no edema  SKIN: no suspicious lesions or rashes  NEURO: Normal strength and tone, mentation intact and speech normal  PSYCH: mentation appears normal, affect normal/bright            Signed Electronically by: Osvaldo Nieves MD

## 2024-10-18 NOTE — PATIENT INSTRUCTIONS
Let's begin physical therapy; they will call you.    COntinue to increase your ozempic.    Let's have you follow up with me in 3 months.    Osvaldo Nieves MD  Internal Medicine and Pediatrics

## 2024-10-18 NOTE — PROGRESS NOTES
The longitudinal plan of care for the diagnosis(es)/condition(s) as documented were addressed during this visit. Due to the added complexity in care, I will continue to support Rosanna in the subsequent management and with ongoing continuity of care.

## 2024-10-19 ENCOUNTER — MYC MEDICAL ADVICE (OUTPATIENT)
Dept: PEDIATRICS | Facility: CLINIC | Age: 34
End: 2024-10-19
Payer: COMMERCIAL

## 2024-10-21 NOTE — TELEPHONE ENCOUNTER
"See patient's [employer] message   - Patient wondering if her A1c result indicates that she is in the prediabetes range     Upon chart review:   - 10/18/2024 result note from Dr. Nieves states: \"Your diabetes blood test (A1c) looks much better! We can recheck at your next follow up in 3 months. It was good seeing you in the office.  Hope you have a great rest of the day!\"    Component      Latest Ref Rng 10/18/2024  2:20 PM   Estimated Average Glucose      <117 mg/dL 128 (H)    Hemoglobin A1C      0.0 - 5.6 % 6.1 (H)       Sent a Integrated biometric[employer] message to the patient   - Informed the patient of the diabetic ranges:     \"Comment: Normal <5.7%  Prediabetes 5.7-6.4%    Diabetes 6.5% or higher\"     - Informed the patient of Dr. Nieves's result note     Component      Latest Ref Rng 6/17/2019  1:56 PM 7/19/2024  2:51 PM 10/18/2024  2:20 PM   Hemoglobin A1C      0.0 - 5.6 % 6.0 (H)  6.7 (H)  6.1 (H)       Harriet JONES RN   [facility]         "

## 2024-10-30 ENCOUNTER — TELEPHONE (OUTPATIENT)
Dept: SLEEP MEDICINE | Facility: CLINIC | Age: 34
End: 2024-10-30

## 2024-10-30 NOTE — TELEPHONE ENCOUNTER
Patient needs to be rescheduled for their virtual visit due to Reason for Reschedule: Patient Request-states she would like a call to reschedule    Scheduling team, please refer to service line late cancellation/no-show policies and reach out to patient at a later date for rescheduling.    Appointment mode: Video  Provider:   Edil Bean MD

## 2024-11-19 ENCOUNTER — THERAPY VISIT (OUTPATIENT)
Dept: PHYSICAL THERAPY | Facility: CLINIC | Age: 34
End: 2024-11-19
Payer: COMMERCIAL

## 2024-11-19 DIAGNOSIS — G89.29 CHRONIC PAIN OF RIGHT KNEE: ICD-10-CM

## 2024-11-19 DIAGNOSIS — M25.561 CHRONIC PAIN OF RIGHT KNEE: ICD-10-CM

## 2024-11-19 DIAGNOSIS — M72.2 PLANTAR FASCIITIS OF LEFT FOOT: Primary | ICD-10-CM

## 2024-11-19 PROCEDURE — 97161 PT EVAL LOW COMPLEX 20 MIN: CPT | Mod: GP | Performed by: PHYSICAL THERAPIST

## 2024-11-19 PROCEDURE — 97110 THERAPEUTIC EXERCISES: CPT | Mod: GP | Performed by: PHYSICAL THERAPIST

## 2024-11-19 ASSESSMENT — ACTIVITIES OF DAILY LIVING (ADL)
HOW_WOULD_YOU_RATE_THE_OVERALL_FUNCTION_OF_YOUR_KNEE_DURING_YOUR_USUAL_DAILY_ACTIVITIES?: NEARLY NORMAL
RISE FROM A CHAIR: ACTIVITY IS NOT DIFFICULT
PLEASE_INDICATE_YOR_PRIMARY_REASON_FOR_REFERRAL_TO_THERAPY:: FOOT AND/OR ANKLE
PUTTING_ON_YOUR_SHOES_OR_SOCKS: NO DIFFICULTY
GETTING_INTO_AND_OUT_OF_A_BATH: NO DIFFICULTY
LIMPING: THE SYMPTOM AFFECTS MY ACTIVITY SLIGHTLY
SWELLING: THE SYMPTOM AFFECTS MY ACTIVITY SLIGHTLY
SITTING_FOR_1_HOUR: NO DIFFICULTY
SIT WITH YOUR KNEE BENT: ACTIVITY IS NOT DIFFICULT
PERFORMING_HEAVY_ACTIVITIES_AROUND_YOUR_HOME: MODERATE DIFFICULTY
LIFTING_AN_OBJECT,_LIKE_A_BAG_OF_GROCERIES_FROM_THE_FLOOR: A LITTLE BIT OF DIFFICULTY
SIT WITH YOUR KNEE BENT: ACTIVITY IS NOT DIFFICULT
SQUAT: ACTIVITY IS NOT DIFFICULT
HOW_WOULD_YOU_RATE_THE_CURRENT_FUNCTION_OF_YOUR_KNEE_DURING_YOUR_USUAL_DAILY_ACTIVITIES_ON_A_SCALE_FROM_0_TO_100_WITH_100_BEING_YOUR_LEVEL_OF_KNEE_FUNCTION_PRIOR_TO_YOUR_INJURY_AND_0_BEING_THE_INABILITY_TO_PERFORM_ANY_OF_YOUR_USUAL_DAILY_ACTIVITIES?: 3
GO UP STAIRS: ACTIVITY IS NOT DIFFICULT
PLEASE_INDICATE_YOR_PRIMARY_REASON_FOR_REFERRAL_TO_THERAPY:: KNEE
STAND: ACTIVITY IS FAIRLY DIFFICULT
AS_A_RESULT_OF_YOUR_KNEE_INJURY,_HOW_WOULD_YOU_RATE_YOUR_CURRENT_LEVEL_OF_DAILY_ACTIVITY?: NEARLY NORMAL
KNEEL ON THE FRONT OF YOUR KNEE: ACTIVITY IS NOT DIFFICULT
STAND: ACTIVITY IS FAIRLY DIFFICULT
GIVING WAY, BUCKLING OR SHIFTING OF KNEE: I DO NOT HAVE THE SYMPTOM
GIVING WAY, BUCKLING OR SHIFTING OF KNEE: I DO NOT HAVE THE SYMPTOM
PAIN: I DO NOT HAVE THE SYMPTOM
LEFS_RAW_SCORE: 0
SWELLING: THE SYMPTOM AFFECTS MY ACTIVITY SLIGHTLY
STIFFNESS: THE SYMPTOM AFFECTS MY ACTIVITY SLIGHTLY
STANDING_FOR_1_HOUR: EXTREME DIFFICULTY OR UNABLE TO PERFORM ACTIVITY
RAW_SCORE: 55
WALKING_A_MILE: EXTREME DIFFICULTY OR UNABLE TO PERFORM ACTIVITY
YOUR_USUAL_HOBBIES,_RECREATIONAL_OR_SPORTING_ACTIVITIES: QUITE A BIT OF DIFFICULTY
ROLLING_OVER_IN_BED: NO DIFFICULTY
KNEE_ACTIVITY_OF_DAILY_LIVING_SCORE: 78.57
SQUATTING: NO DIFFICULTY
ANY_OF_YOUR_USUAL_WORK,_HOUSEWORK_OR_SCHOOL_ACTIVITIES: MODERATE DIFFICULTY
LIMPING: THE SYMPTOM AFFECTS MY ACTIVITY SLIGHTLY
WEAKNESS: THE SYMPTOM AFFECTS MY ACTIVITY SLIGHTLY
GOING_UP_OR_DOWN_10_STAIRS: A LITTLE BIT OF DIFFICULTY
GO DOWN STAIRS: ACTIVITY IS NOT DIFFICULT
HOW_WOULD_YOU_RATE_THE_CURRENT_FUNCTION_OF_YOUR_KNEE_DURING_YOUR_USUAL_DAILY_ACTIVITIES_ON_A_SCALE_FROM_0_TO_100_WITH_100_BEING_YOUR_LEVEL_OF_KNEE_FUNCTION_PRIOR_TO_YOUR_INJURY_AND_0_BEING_THE_INABILITY_TO_PERFORM_ANY_OF_YOUR_USUAL_DAILY_ACTIVITIES?: 3
MAKING_SHARP_TURNS_WHILE_RUNNING_FAST: QUITE A BIT OF DIFFICULTY
HOW_WOULD_YOU_RATE_THE_OVERALL_FUNCTION_OF_YOUR_KNEE_DURING_YOUR_USUAL_DAILY_ACTIVITIES?: NEARLY NORMAL
KNEEL ON THE FRONT OF YOUR KNEE: ACTIVITY IS NOT DIFFICULT
GETTING_INTO_OR_OUT_OF_A_CAR: NO DIFFICULTY
WALK: ACTIVITY IS VERY DIFFICULT
RUNNING_ON_UNEVEN_GROUND: EXTREME DIFFICULTY OR UNABLE TO PERFORM ACTIVITY
LEFS_SCORE(%): 0
WALKING_2_BLOCKS: EXTREME DIFFICULTY OR UNABLE TO PERFORM ACTIVITY
WALK: ACTIVITY IS VERY DIFFICULT
STIFFNESS: THE SYMPTOM AFFECTS MY ACTIVITY SLIGHTLY
GO DOWN STAIRS: ACTIVITY IS NOT DIFFICULT
WALKING_BETWEEN_ROOMS: MODERATE DIFFICULTY
PERFORMING_LIGHT_ACTIVITIES_AROUND_YOUR_HOME: MODERATE DIFFICULTY
KNEE_ACTIVITY_OF_DAILY_LIVING_SUM: 55
GO UP STAIRS: ACTIVITY IS NOT DIFFICULT
SQUAT: ACTIVITY IS NOT DIFFICULT
AS_A_RESULT_OF_YOUR_KNEE_INJURY,_HOW_WOULD_YOU_RATE_YOUR_CURRENT_LEVEL_OF_DAILY_ACTIVITY?: NEARLY NORMAL
SHOPPING: EXTREME DIFFICULTY OR UNABLE TO PERFORM ACTIVITY
PAIN: I DO NOT HAVE THE SYMPTOM
WEAKNESS: THE SYMPTOM AFFECTS MY ACTIVITY SLIGHTLY
RISE FROM A CHAIR: ACTIVITY IS NOT DIFFICULT
RUNNING_ON_EVEN_GROUND: EXTREME DIFFICULTY OR UNABLE TO PERFORM ACTIVITY

## 2024-11-19 NOTE — PROGRESS NOTES
PHYSICAL THERAPY EVALUATION  Type of Visit: Evaluation        Fall Risk Screen:  Fall screen completed by: PT  Have you fallen 2 or more times in the past year?: No  Have you fallen and had an injury in the past year?: No  Is patient a fall risk?: No    Subjective         Presenting condition or subjective complaint: (Patient-Rptd) I have plantar fasciitis in my left foot and the back of my knee hurts when I walk sometimes.    Pain has been on and off for a few years, but has persisted. Left foot pain from heel and into distal foot/fascia region. Right knee pain is more back of knee; this only occurs when left foot is flared up. Pain in both with increased standing and walking.     Date of onset: 10/18/24 (md referral date)    Relevant medical history:     Dates & types of surgery:      Prior diagnostic imaging/testing results: (Patient-Rptd) X-ray     Prior therapy history for the same diagnosis, illness or injury: (Patient-Rptd) No        Living Environment  Social support: (Patient-Rptd) With family members   Type of home: (Patient-Rptd) Apartment/condo   Stairs to enter the home: (Patient-Rptd) No       Ramp: (Patient-Rptd) No   Stairs inside the home: (Patient-Rptd) No       Help at home: (Patient-Rptd) None  Equipment owned:       Employment: (Patient-Rptd) No    Hobbies/Interests:      Patient goals for therapy: (Patient-Rptd) Walk and stand for a long time without feeling any pain in my foot or the back of my knee.         Objective   FOOT/ANKLE EVALUATION  PAIN: Pain Location: left heel area and into plantar fascia  Pain Frequency: intermittent  Pain is Exacerbated By: standing, walking  Pain is Relieved By: rest  INTEGUMENTARY (edema, incisions): WNL  POSTURE: WNL  GAIT:     Gait Deviations:  more of toe out position left > right; increased pronation in standing  BALANCE/PROPRIOCEPTION: Single Leg Stance Eyes Open (seconds): 30 sec left and right     ROM:  left inversion, eversion, PF all WNL; end range  tightness into DF due to gastroc and soleus tightness    STRENGTH:  left inversion 4+/5; eversion, DF 5/5; PF 5-/5; right ankle5/5  FLEXIBILITY:  tightness left > right gastroc and soleus  SPECIAL TESTS: WNL    PALPATION:  left proximal and distal heel region into left plantarfascia  JOINT MOBILITY: WNL    KNEE EVALUATION  PAIN: Pain Location: right posterior knee area  Pain Frequency: intermittent  Pain is Exacerbated By: seems to occur as left foot pain increases  Pain is Relieved By: rest    ROM: AROM WNL  Right knee 0-130    STRENGTH:  right knee flex and extension 5/5 painfree; bilateral hip abduction and extension 5-/5  FLEXIBILITY: WNL  SPECIAL TESTS:    Left Right   Apley's (Meniscus)  Negative    Martita's (Meniscus)  Negative    Gregor's (ITB/TFL)     Patellar Apprehension Test  Negative    Patella Tracking     Ligamentous Stability     Anterior Drawer (ACL)  Negative   Posterior Drawer (PCL)     Prone Dial Test at 30 Deg and 90 Deg (PCL/PLC)     Valgus Stress Testing at 0 Deg and 30 Deg  Negative   Varus Stress Testing at 0 Deg and 30 Deg   Negative     FUNCTIONAL TESTS:  able to do squat without knee pain right  PALPATION: WNL  JOINT MOBILITY: WNL    Assessment & Plan   CLINICAL IMPRESSIONS  Medical Diagnosis: Chronic pain of right knee; Plantar fasciitis of left foot    Treatment Diagnosis: Chronic pain of right knee;  Plantar fasciitis of left foot   Impression/Assessment: Patient is a 34 year old female with left foot and right knee complaints.  The following significant findings have been identified: Pain, Decreased ROM/flexibility, Decreased joint mobility, Decreased strength, Impaired muscle performance, and Decreased activity tolerance. These impairments interfere with their ability to perform self care tasks, recreational activities, household chores, driving , household mobility, and community mobility as compared to previous level of function.     Clinical Decision Making (Complexity):  Clinical  Presentation: Stable/Uncomplicated  Clinical Presentation Rationale: based on medical and personal factors listed in PT evaluation  Clinical Decision Making (Complexity): Low complexity    PLAN OF CARE  Treatment Interventions:  Interventions: Manual Therapy, Neuromuscular Re-education, Therapeutic Activity, Therapeutic Exercise, Self-Care/Home Management    Long Term Goals     PT Goal 1  Goal Identifier: Ambulation  Goal Description: Minutes patient will be able to  walk; on uneven terrain; on sharp inclines/declines; Able to make sharp turns; Ambulate without gait deviation 30 min; pain 0/10  Rationale: to maximize safety and independence with performance of ADLs and functional tasks;to maximize safety and independence within the home;to maximize safety and independence within the community;to maximize safety and independence with transportation;to maximize safety and independence with self cares  Target Date: 02/11/25  PT Goal 2  Goal Identifier: Standing  Goal Description: able to stand for prolonged periods, 60 min, pain 0/10 for daily activities including cooking, cleaning  Rationale: to maximize safety and independence with performance of ADLs and functional tasks;to maximize safety and independence within the home;to maximize safety and independence within the community;to maximize safety and independence with transportation;to maximize safety and independence with self cares  Target Date: 02/11/25      Frequency of Treatment: every 2 weeks  Duration of Treatment: 12 weeks      Education Assessment:   Learner/Method: Patient;Pictures/Video;No Barriers to Learning  Education Comments: Educated pt on findings of the evaluation and reasoning for plan of care.  Pt was able to understand how treatment plan aligns with goals.    Risks and benefits of evaluation/treatment have been explained.   Patient/Family/caregiver agrees with Plan of Care.     Evaluation Time:     PT Eval, Low Complexity Minutes (16692):  18       Signing Clinician: ARLEEN Cruz [facility]                                                                                   OUTPATIENT PHYSICAL THERAPY      PLAN OF TREATMENT FOR OUTPATIENT REHABILITATION   Patient's Last Name, First Name, Rosanna Cardona YOB: 1990   Provider's Name   [facility]   Medical Record No.  [id number]     Onset Date: 10/18/24 (md referral date)  Start of Care Date: 11/19/24     Medical Diagnosis:  Chronic pain of right knee; Plantar fasciitis of left foot      PT Treatment Diagnosis:  Chronic pain of right knee;  Plantar fasciitis of left foot Plan of Treatment  Frequency/Duration: every 2 weeks/ 12 weeks    Certification date from 11/19/24 to 02/11/25         See note for plan of treatment details and functional goals     Anna Tuttle, PT                         I CERTIFY THE NEED FOR THESE SERVICES FURNISHED UNDER        THIS PLAN OF TREATMENT AND WHILE UNDER MY CARE     (Physician attestation of this document indicates review and certification of the therapy plan).              Referring Provider:  Osvaldo Nieves    Initial Assessment  See Epic Evaluation- Start of Care Date: 11/19/24

## 2024-12-02 ENCOUNTER — MYC MEDICAL ADVICE (OUTPATIENT)
Dept: PEDIATRICS | Facility: CLINIC | Age: 34
End: 2024-12-02
Payer: COMMERCIAL

## 2024-12-02 DIAGNOSIS — E11.9 TYPE 2 DIABETES MELLITUS WITHOUT COMPLICATION, WITHOUT LONG-TERM CURRENT USE OF INSULIN (H): ICD-10-CM

## 2024-12-02 NOTE — TELEPHONE ENCOUNTER
Per chart review-     0.5 mg started 10/24/2024, 1.0 mg started 11/21/2024, and 2 mg starts 12/19/24?  Requested to know when patient started 2 mg, seems to be ahead of schedule.     AISHWARYA Tejeda on 12/2/2024 at 5:57 PM

## 2024-12-03 NOTE — TELEPHONE ENCOUNTER
"Please see patient MyChart  -states took last 2 mg dose of Ozempic on 12/1/24  -states started 2 mg dose 4 weeks ago and injects once a week  -requesting refill of  2 mg dose    Per chart review  OV 10/18/24 with Dr. Nieves  \"Type 2 diabetes mellitus without complication, without long-term current use of insulin (H)  Tolerating ozempic well.  Refilled.   - Hemoglobin A1c; Future  - Semaglutide, 2 MG/DOSE, (OZEMPIC) 8 MG/3ML pen; Inject 2 mg subcutaneously every 7 days\"    Semaglutide, 2 MG/DOSE, (OZEMPIC) 8 MG/3ML pen 9 mL 3 12/19/2024 -- No   Sig - Route: Inject 2 mg subcutaneously every 7 days. - Subcutaneous     Semaglutide, 1 MG/DOSE, (OZEMPIC) 4 MG/3ML pen 3 mL 0 11/21/2024 -- No   Sig - Route: Inject 1 mg subcutaneously every 7 days. - Subcutaneous   Sent to pharmacy as: Semaglutide (1 MG/DOSE) 4 MG/3ML Subcutaneous     semaglutide (OZEMPIC) 2 MG/3ML pen 3 mL 0 10/24/2024 -- No   Sig - Route: Inject 0.5 mg subcutaneously every 7 days. - Subcutaneous   Sent to pharmacy as: Semaglutide(0.25 or 0.5MG/DOS) 2 MG/3ML Solution Pen-injector (OZEMPIC     semaglutide (OZEMPIC) 2 MG/3ML pen 3 mL 0 9/26/2024 -- No   Sig - Route: Inject 0.25 mg subcutaneously every 7 days. - Subcutaneous   Sent to pharmacy as: Semaglutide(0.25 or 0.5MG/DOS) 2 MG/3ML Solution Pen-injector (OZEMPIC)     Dr. Nieves please review and advise.    Harriet Galo, AISHWARYA    "

## 2024-12-12 NOTE — TELEPHONE ENCOUNTER
Pt was called to schedule appt with Dr Hermosillo. KEYLA with phone number to call asking them to call back.      Antoinette Tatum    [facility]

## 2025-01-07 ENCOUNTER — MYC MEDICAL ADVICE (OUTPATIENT)
Dept: PEDIATRICS | Facility: CLINIC | Age: 35
End: 2025-01-07
Payer: COMMERCIAL

## 2025-01-07 NOTE — LETTER
January 9, 2025      Rosanna Leung  [address]   [place] [address]        To Whom It May Concern:    Rosanna Leung  was recently diagnosed with Type 2 diabetes mellitus and begun on medications with significant side effects.  She missed classes due to this.         Sincerely,        Osvaldo Nieves MD    Electronically signed

## 2025-01-07 NOTE — TELEPHONE ENCOUNTER
Patient calls in follow up from [employer] message sent today.    - patient requesting either letter on letter head or attached form to be filled out  - explaining diabetes diagnosis, symptoms she experiences, side effects from medication, and that it is ok for her to return to school.  - patient requesting form or letter be completed by 1/10, informed patient that we typically advise 5-7 day turn around for forms/letters, patient is aware.  - patient has upcoming appointment 1/17, but needs these turned in prior to that.    MA/TC - please print out for Dr Nieves.    Dee Bass RN

## 2025-01-09 ENCOUNTER — MEDICAL CORRESPONDENCE (OUTPATIENT)
Dept: HEALTH INFORMATION MANAGEMENT | Facility: CLINIC | Age: 35
End: 2025-01-09
Payer: COMMERCIAL

## 2025-01-09 NOTE — TELEPHONE ENCOUNTER
Filled out by provider,faxed and abstracted.    Also uploading copy to pt [employer].    KATHERYN

## 2025-01-09 NOTE — TELEPHONE ENCOUNTER
Done.  In my outbox.  Please call patient.     Osvaldo Nieves MD  Internal Medicine and Pediatrics

## 2025-01-21 ENCOUNTER — MYC MEDICAL ADVICE (OUTPATIENT)
Dept: PEDIATRICS | Facility: CLINIC | Age: 35
End: 2025-01-21

## 2025-01-21 ENCOUNTER — NURSE TRIAGE (OUTPATIENT)
Dept: NURSING | Facility: CLINIC | Age: 35
End: 2025-01-21

## 2025-01-22 NOTE — TELEPHONE ENCOUNTER
See nurse triage encounter 1/21/25, patient was triaged for this concern. RN called and left voicemail to call back, looks like patient was not scheduled for appointment per disposition. [employer] message sent at this time.       Moustapha CHARLES RN 1/22/2025 at 7:41 AM

## 2025-01-22 NOTE — TELEPHONE ENCOUNTER
"Nurse Triage SBAR    Is this a 2nd Level Triage? NO    Situation:   Rectal bleeding    Background:   She is on ozempic.  Just started taking aspirin again yesterday - she is not sure why she is on it but was advised by pcp.  She took 2 of her birth control pill today because she missed a dose yesterday.    Assessment:   Pt noted very light pink when she wiped before she went to the bathroom - she thinks this might have came from her vagina but she is not sure.  Then she had a bowel movement.  Blood mixed in with bm.  No abdominal pain other than mild lower abdominal cramping - she thinks that she is having PMS symptoms.    She does note that yesterday, she was feeling constipated.  Had hard pebble stools.  Today's stool was not hard.    Protocol Recommended Disposition:   See PCP Within 3 Days    Recommendation:   Advised pt to be seen within 3 days.  Care advice given.  Pt did send a [employer] message to pcp.    Kourtney Mcpherson RN, BSN Nurse Triage Advisor 1/21/2025 7:15 PM        Reason for Disposition   MILD rectal bleeding (more than just a few drops or streaks)    Additional Information   Negative: Shock suspected (e.g., cold/pale/clammy skin, too weak to stand, low BP, rapid pulse)   Negative: Difficult to awaken or acting confused (e.g., disoriented, slurred speech)   Negative: Passed out (i.e., lost consciousness, collapsed and was not responding)   Negative: [1] Vomiting AND [2] contains red blood or black (\"coffee ground\") material  (Exception: Few red streaks in vomit that only happened once.)   Negative: Sounds like a life-threatening emergency to the triager   Negative: Diarrhea is main symptom   Negative: Stool color other than brown or tan is main concern  (no bleeding and no melena)   Negative: SEVERE rectal bleeding (large blood clots; constant or on and off bleeding)   Negative: SEVERE dizziness (e.g., unable to stand, requires support to walk, feels like passing out now)   Negative: [1] MODERATE rectal " bleeding (small blood clots, passing blood without stool, or toilet water turns red) AND [2] more than once a day   Negative: Pale skin (pallor) of new-onset or worsening   Negative: Black or tarry bowel movements  (Exception: Chronic-unchanged black-grey BMs AND is taking iron pills or Pepto-Bismol.)   Negative: [1] Constant abdominal pain AND [2] present > 2 hours   Negative: Rectal foreign body (i.e., now or within past week;  inserted or swallowed)   Negative: High-risk adult (e.g., prior surgery on aorta, abdominal aortic aneurysm)   Negative: Taking Coumadin (warfarin) or other strong blood thinner, or known bleeding disorder (e.g., thrombocytopenia)   Negative: Known cirrhosis of the liver (or history of liver failure or ascites)   Negative: [1] Colonoscopy AND [2] in past 72 hours   Negative: Patient sounds very sick or weak to the triager   Negative: MODERATE rectal bleeding (small blood clots, passing blood without stool, or toilet water turns red)    Protocols used: Rectal Bleeding-A-AH

## 2025-02-02 ENCOUNTER — HEALTH MAINTENANCE LETTER (OUTPATIENT)
Age: 35
End: 2025-02-02

## 2025-02-21 PROBLEM — M25.561 CHRONIC PAIN OF RIGHT KNEE: Status: RESOLVED | Noted: 2024-11-19 | Resolved: 2025-02-21

## 2025-02-21 PROBLEM — G89.29 CHRONIC PAIN OF RIGHT KNEE: Status: RESOLVED | Noted: 2024-11-19 | Resolved: 2025-02-21

## 2025-02-21 PROBLEM — M72.2 PLANTAR FASCIITIS OF LEFT FOOT: Status: RESOLVED | Noted: 2024-11-19 | Resolved: 2025-02-21

## 2025-03-20 ENCOUNTER — OFFICE VISIT (OUTPATIENT)
Dept: OBGYN | Facility: CLINIC | Age: 35
End: 2025-03-20
Payer: COMMERCIAL

## 2025-03-20 VITALS — SYSTOLIC BLOOD PRESSURE: 112 MMHG | BODY MASS INDEX: 44.3 KG/M2 | DIASTOLIC BLOOD PRESSURE: 74 MMHG | WEIGHT: 250 LBS

## 2025-03-20 DIAGNOSIS — Z12.4 PAP SMEAR FOR CERVICAL CANCER SCREENING: Primary | ICD-10-CM

## 2025-03-20 DIAGNOSIS — Z30.430 ENCOUNTER FOR INSERTION OF MIRENA IUD: ICD-10-CM

## 2025-03-20 DIAGNOSIS — Z30.430 ENCOUNTER FOR INSERTION OF INTRAUTERINE CONTRACEPTIVE DEVICE: ICD-10-CM

## 2025-03-20 LAB
HCG UR QL: NEGATIVE
INTERNAL QC OK POCT: NORMAL
POCT KIT EXPIRATION DATE: NORMAL
POCT KIT LOT NUMBER: NORMAL

## 2025-03-20 NOTE — PROGRESS NOTES
INDICATIONS:                                                      Rosanna Leung is a 34 year old female,   who presents for insertion of an IUD. The risks, benefits and alternatives of IUD insertion were discussed in detail. She has elected to go ahead with the insertion today and her questions were answered. Consent was signed.  A pregnancy was performed today:  Yes. And was negative.      PROCEDURE:                                                      The pelvic exam revealed normal external genitalia.  A speculum was inserted into the vagina and the cervix was visualized. Pap smear was performed.  The cervix was prepped with Betadine . Lidocaine gel was applied to the anterior lip of the cervix and the external os.  After 60 seconds, lidocaine gel was drawn up into the Pipelle endometrial biopsy catheter.  This catheter was then advanced to the level of the internal os and the lidocaine was deployed into the uterine cavity.  After waiting 60 seconds, the catheter was advanced to the uterine fundus.   The uterus sounded to 8 cm. A Mirena IUD was then inserted without difficulty. The string was cut to 3 cm.  The patient experienced a mild amount of cramping.    POST PROCEDURE:                                                      She  tolerated the procedure well with minimal discomfort. There were no complications. Patient was discharged in stable condition.    Call if severe cramping, fever, abnormal bleeding, abnormal discharge or pelvic pain develop.  Patient was counseled about the chance of irregular bleeding.    Yvette Spaulding MD

## 2025-03-27 LAB
BKR AP ASSOCIATED HPV REPORT: NORMAL
BKR LAB AP GYN ADEQUACY: NORMAL
BKR LAB AP GYN INTERPRETATION: NORMAL
BKR LAB AP LMP: NORMAL
BKR LAB AP PREVIOUS ABNORMAL: NORMAL
PATH REPORT.COMMENTS IMP SPEC: NORMAL
PATH REPORT.COMMENTS IMP SPEC: NORMAL
PATH REPORT.RELEVANT HX SPEC: NORMAL

## 2025-04-03 LAB
HPV HR 12 DNA CVX QL NAA+PROBE: NEGATIVE
HPV16 DNA CVX QL NAA+PROBE: NEGATIVE
HPV18 DNA CVX QL NAA+PROBE: NEGATIVE
HUMAN PAPILLOMA VIRUS FINAL DIAGNOSIS: NORMAL

## 2025-05-10 ENCOUNTER — HEALTH MAINTENANCE LETTER (OUTPATIENT)
Age: 35
End: 2025-05-10

## 2025-07-01 ENCOUNTER — NURSE TRIAGE (OUTPATIENT)
Dept: PEDIATRICS | Facility: CLINIC | Age: 35
End: 2025-07-01
Payer: COMMERCIAL

## 2025-07-01 NOTE — TELEPHONE ENCOUNTER
S-(situation): RN called to triage leg symptoms.     B-(background): Patient moved 2 weeks ago, felt pain after moving. Patient has had similar pain in the past.     A-(assessment): Patient experiencing leg pain and swelling in back of right leg, close to back of knee. Hurting for past 2 weeks. Pain is not as bad today compared to yesterday. Patient has been icing the area and taking ibuprofen. Patient rates pain level as 5 or 6 when walking. If standing or walking for a long time, pain can be 10/10. Sometimes has a hard time getting up from seated position.     R-(recommendations): RN advised visit for evaluation within the next few days. RN offered appointment tomorrow. Patient declined visit, stating conflict with work schedule. Patient plans to go to  as this would work better with work schedule. No further questions at this time.     Reason for Disposition   Injury and pain has not improved after 3 days    Additional Information   Negative: Looks like a broken bone or dislocated joint (e.g., crooked or deformed)   Negative: Sounds like a life-threatening emergency to the triager   Negative: No prior tetanus shots (or is not fully vaccinated) and any wound (e.g., cut or scrape)   Negative: HIV positive or severe immunodeficiency (severely weak immune system) and DIRTY cut or scrape   Negative: Patient wants to be seen   Negative: MODERATE pain (e.g., interferes with normal activities, limping) and high-risk adult (e.g., age > 60 years, osteoporosis, chronic steroid use)   Negative: Large swelling or bruise (> 2 inches or 5 cm)   Negative: Suspicious history for the injury   Negative: Last tetanus shot > 5 years ago and DIRTY cut or scrape   Negative: Last tetanus shot > 10 years ago and CLEAN cut or scrape   Negative: SEVERE pain (e.g., excruciating pain, unable to do any normal activities)   Negative: Skin is split open or gaping (or length > 1/2 inch or 12 mm)   Negative: Bleeding won't stop after 10 minutes  of direct pressure (using correct technique)   Negative: Dirt in the wound and not removed with 15 minutes of scrubbing   Negative: New numbness (loss of sensation) or weakness of foot or toe(s) and present now   Negative: Sounds like a serious injury to the triager   Negative: Wound looks infected (e.g., spreading redness, pus)   Negative: Leg pain from overuse (e.g., sports, running, physical work)   Negative: Leg pain not from an injury   Negative: Hip injury is main concern   Negative: Knee injury is main concern   Negative: Ankle injury is main concern   Negative: Major bleeding (actively dripping or spurting) that can't be stopped   Negative: Bullet, stabbed by knife, or other serious penetrating wound   Negative: Looks like a dislocated joint (crooked or deformed)   Negative: Can't stand (bear weight) or walk   Negative: Serious injury with multiple fractures (broken bones)   Negative: Sounds like a life-threatening emergency to the triager    Protocols used: Leg Pain-A-OH, Leg Injury-A-OH    Moustapha CHARLES RN 7/1/2025 at 4:45 PM

## 2025-08-05 ENCOUNTER — MYC MEDICAL ADVICE (OUTPATIENT)
Dept: PEDIATRICS | Facility: CLINIC | Age: 35
End: 2025-08-05

## 2025-08-13 ENCOUNTER — ANCILLARY PROCEDURE (OUTPATIENT)
Dept: ULTRASOUND IMAGING | Facility: CLINIC | Age: 35
End: 2025-08-13
Payer: COMMERCIAL

## 2025-08-13 DIAGNOSIS — Z30.431 IUD CHECK UP: ICD-10-CM

## 2025-08-13 PROCEDURE — 76830 TRANSVAGINAL US NON-OB: CPT | Performed by: FAMILY MEDICINE

## 2025-08-13 PROCEDURE — 76377 3D RENDER W/INTRP POSTPROCES: CPT | Performed by: FAMILY MEDICINE

## 2025-08-13 PROCEDURE — 76856 US EXAM PELVIC COMPLETE: CPT | Performed by: FAMILY MEDICINE

## 2025-08-21 ENCOUNTER — OFFICE VISIT (OUTPATIENT)
Dept: OBGYN | Facility: CLINIC | Age: 35
End: 2025-08-21
Payer: COMMERCIAL

## 2025-08-21 VITALS — DIASTOLIC BLOOD PRESSURE: 60 MMHG | WEIGHT: 236 LBS | BODY MASS INDEX: 41.82 KG/M2 | SYSTOLIC BLOOD PRESSURE: 114 MMHG

## 2025-08-21 DIAGNOSIS — Z30.433 ENCOUNTER FOR REMOVAL AND REINSERTION OF INTRAUTERINE CONTRACEPTIVE DEVICE: ICD-10-CM

## 2025-08-21 DIAGNOSIS — Z30.431 INTRAUTERINE DEVICE SURVEILLANCE: Primary | ICD-10-CM

## 2025-08-21 DIAGNOSIS — Z30.430 ENCOUNTER FOR INSERTION OF MIRENA IUD: ICD-10-CM

## 2025-08-23 ENCOUNTER — HEALTH MAINTENANCE LETTER (OUTPATIENT)
Age: 35
End: 2025-08-23